# Patient Record
Sex: FEMALE | Race: WHITE | HISPANIC OR LATINO | Employment: FULL TIME | URBAN - METROPOLITAN AREA
[De-identification: names, ages, dates, MRNs, and addresses within clinical notes are randomized per-mention and may not be internally consistent; named-entity substitution may affect disease eponyms.]

---

## 2019-05-09 ENCOUNTER — HOSPITAL ENCOUNTER (EMERGENCY)
Facility: HOSPITAL | Age: 24
Discharge: PSYCHIATRIC OR SUBSTANCE USE FACILITY | End: 2019-05-10
Attending: EMERGENCY MEDICINE
Payer: COMMERCIAL

## 2019-05-09 DIAGNOSIS — F41.9 ANXIETY: ICD-10-CM

## 2019-05-09 DIAGNOSIS — F43.0 ACUTE STRESS REACTION: Primary | ICD-10-CM

## 2019-05-09 DIAGNOSIS — F43.0 ACUTE STRESS REACTION WITH PREDOMINATELY EMOTIONAL DISTURBANCE: ICD-10-CM

## 2019-05-09 DIAGNOSIS — F43.10 PTSD (POST-TRAUMATIC STRESS DISORDER): ICD-10-CM

## 2019-05-09 PROCEDURE — 99285 EMERGENCY DEPT VISIT HI MDM: CPT | Mod: 25

## 2019-05-09 PROCEDURE — 99283 PR EMERGENCY DEPT VISIT,LEVEL III: ICD-10-PCS | Mod: GT,,, | Performed by: NURSE PRACTITIONER

## 2019-05-09 PROCEDURE — 99283 EMERGENCY DEPT VISIT LOW MDM: CPT | Mod: GT,,, | Performed by: NURSE PRACTITIONER

## 2019-05-10 VITALS
HEART RATE: 80 BPM | HEIGHT: 65 IN | BODY MASS INDEX: 18.06 KG/M2 | SYSTOLIC BLOOD PRESSURE: 124 MMHG | DIASTOLIC BLOOD PRESSURE: 72 MMHG | RESPIRATION RATE: 20 BRPM | WEIGHT: 108.38 LBS | OXYGEN SATURATION: 98 % | TEMPERATURE: 98 F

## 2019-05-10 LAB
ALBUMIN SERPL BCP-MCNC: 4.6 G/DL (ref 3.5–5.2)
ALP SERPL-CCNC: 82 U/L (ref 55–135)
ALT SERPL W/O P-5'-P-CCNC: 12 U/L (ref 10–44)
AMPHET+METHAMPHET UR QL: NEGATIVE
ANION GAP SERPL CALC-SCNC: 10 MMOL/L (ref 8–16)
APAP SERPL-MCNC: <3 UG/ML (ref 10–20)
AST SERPL-CCNC: 20 U/L (ref 10–40)
B-HCG UR QL: NEGATIVE
BARBITURATES UR QL SCN>200 NG/ML: NEGATIVE
BASOPHILS # BLD AUTO: 0.03 K/UL (ref 0–0.2)
BASOPHILS NFR BLD: 0.2 % (ref 0–1.9)
BENZODIAZ UR QL SCN>200 NG/ML: NEGATIVE
BILIRUB SERPL-MCNC: 0.7 MG/DL (ref 0.1–1)
BILIRUB UR QL STRIP: NEGATIVE
BUN SERPL-MCNC: 11 MG/DL (ref 6–20)
BZE UR QL SCN: NEGATIVE
CALCIUM SERPL-MCNC: 9.8 MG/DL (ref 8.7–10.5)
CANNABINOIDS UR QL SCN: NORMAL
CHLORIDE SERPL-SCNC: 107 MMOL/L (ref 95–110)
CLARITY UR: CLEAR
CO2 SERPL-SCNC: 23 MMOL/L (ref 23–29)
COLOR UR: YELLOW
CREAT SERPL-MCNC: 0.8 MG/DL (ref 0.5–1.4)
CREAT UR-MCNC: 46.2 MG/DL (ref 15–325)
DIFFERENTIAL METHOD: ABNORMAL
EOSINOPHIL # BLD AUTO: 0.2 K/UL (ref 0–0.5)
EOSINOPHIL NFR BLD: 1.5 % (ref 0–8)
ERYTHROCYTE [DISTWIDTH] IN BLOOD BY AUTOMATED COUNT: 12.5 % (ref 11.5–14.5)
EST. GFR  (AFRICAN AMERICAN): >60 ML/MIN/1.73 M^2
EST. GFR  (NON AFRICAN AMERICAN): >60 ML/MIN/1.73 M^2
ETHANOL SERPL-MCNC: <10 MG/DL
GLUCOSE SERPL-MCNC: 94 MG/DL (ref 70–110)
GLUCOSE UR QL STRIP: NEGATIVE
HCT VFR BLD AUTO: 42 % (ref 37–48.5)
HGB BLD-MCNC: 14.7 G/DL (ref 12–16)
HGB UR QL STRIP: NEGATIVE
KETONES UR QL STRIP: NEGATIVE
LEUKOCYTE ESTERASE UR QL STRIP: NEGATIVE
LYMPHOCYTES # BLD AUTO: 4.1 K/UL (ref 1–4.8)
LYMPHOCYTES NFR BLD: 33.3 % (ref 18–48)
MCH RBC QN AUTO: 31.1 PG (ref 27–31)
MCHC RBC AUTO-ENTMCNC: 35 G/DL (ref 32–36)
MCV RBC AUTO: 89 FL (ref 82–98)
METHADONE UR QL SCN>300 NG/ML: NEGATIVE
MONOCYTES # BLD AUTO: 0.9 K/UL (ref 0.3–1)
MONOCYTES NFR BLD: 7 % (ref 4–15)
NEUTROPHILS # BLD AUTO: 7.1 K/UL (ref 1.8–7.7)
NEUTROPHILS NFR BLD: 58 % (ref 38–73)
NITRITE UR QL STRIP: NEGATIVE
OPIATES UR QL SCN: NEGATIVE
PCP UR QL SCN>25 NG/ML: NEGATIVE
PH UR STRIP: 8 [PH] (ref 5–8)
PLATELET # BLD AUTO: 282 K/UL (ref 150–350)
PMV BLD AUTO: 9.6 FL (ref 9.2–12.9)
POTASSIUM SERPL-SCNC: 3.7 MMOL/L (ref 3.5–5.1)
PROT SERPL-MCNC: 7.4 G/DL (ref 6–8.4)
PROT UR QL STRIP: NEGATIVE
RBC # BLD AUTO: 4.72 M/UL (ref 4–5.4)
SALICYLATES SERPL-MCNC: <5 MG/DL (ref 15–30)
SODIUM SERPL-SCNC: 140 MMOL/L (ref 136–145)
SP GR UR STRIP: 1.01 (ref 1–1.03)
TOXICOLOGY INFORMATION: NORMAL
TSH SERPL DL<=0.005 MIU/L-ACNC: 3.6 UIU/ML (ref 0.4–4)
URN SPEC COLLECT METH UR: NORMAL
UROBILINOGEN UR STRIP-ACNC: NEGATIVE EU/DL
WBC # BLD AUTO: 12.21 K/UL (ref 3.9–12.7)

## 2019-05-10 PROCEDURE — 93010 EKG 12-LEAD: ICD-10-PCS | Mod: ICN,,, | Performed by: INTERNAL MEDICINE

## 2019-05-10 PROCEDURE — 80307 DRUG TEST PRSMV CHEM ANLYZR: CPT

## 2019-05-10 PROCEDURE — 81003 URINALYSIS AUTO W/O SCOPE: CPT | Mod: 59

## 2019-05-10 PROCEDURE — 80329 ANALGESICS NON-OPIOID 1 OR 2: CPT

## 2019-05-10 PROCEDURE — 93005 ELECTROCARDIOGRAM TRACING: CPT

## 2019-05-10 PROCEDURE — 85025 COMPLETE CBC W/AUTO DIFF WBC: CPT

## 2019-05-10 PROCEDURE — 80053 COMPREHEN METABOLIC PANEL: CPT

## 2019-05-10 PROCEDURE — 81025 URINE PREGNANCY TEST: CPT

## 2019-05-10 PROCEDURE — 80320 DRUG SCREEN QUANTALCOHOLS: CPT

## 2019-05-10 PROCEDURE — 93010 ELECTROCARDIOGRAM REPORT: CPT | Mod: ICN,,, | Performed by: INTERNAL MEDICINE

## 2019-05-10 PROCEDURE — 84443 ASSAY THYROID STIM HORMONE: CPT

## 2019-05-10 RX ORDER — VENLAFAXINE 75 MG/1
75 TABLET ORAL 2 TIMES DAILY
COMMUNITY
End: 2020-02-26

## 2019-05-10 RX ORDER — QUETIAPINE FUMARATE 50 MG/1
50 TABLET, FILM COATED ORAL NIGHTLY
COMMUNITY
End: 2020-02-26

## 2019-05-10 NOTE — ED NOTES
ACKNOWLEDGMENT OF RIGHTS SIGNED & PLACED ON CHART.     SAFE WORD: pt. Refused     PEC process explained to pt. Educated on visiting hours. Verbalizes understanding of POC. Pt remains in grey gown & yellow non slip socks. Rex at bedside for direct observation. NAD noted. Denies any other needs at the time. Will continue to monitor.    CONTACT #1   See pt. Paper chart.     Patient belongings include:   Clothing  Watch   Earrings and nose ring  Cell phone  Wallet with cards and loose change  Tan bag with clothes   Black bag with books  Pillow   Binder  . PATIENT BELONGINGS SECURED IN locker 29.

## 2019-05-10 NOTE — ED PROVIDER NOTES
SCRIBE #1 NOTE: I, Danni Smith, am scribing for, and in the presence of, Candy Cole MD. I have scribed the entire note.       History     Chief Complaint   Patient presents with    Anxiety     pt reports feelings of anxiousness and overwhelmed with past events. pt denies si, hi or vh. pt reports compliance with medications.      Review of patient's allergies indicates:  No Known Allergies      History of Present Illness     HPI    5/9/2019, 10:33 PM  History obtained from the patient      History of Present Illness: Toño Cavanaugh is a 23 y.o. female patient with a PMHx of major depression and mood disorder who presents to the Emergency Department for evaluation of anxiety which onset gradually a few days ago. Pt reports she feels overwhelmed and is having flashbacks of traumatic events that happened years ago. Pt also reports her fiancee broke up with her yesterday. Symptoms are constant and moderate in severity. No mitigating or exacerbating factors reported. No associated sxs reported. Patient denies any fever, chills, N/V/D, SI, HI, behavior problems, confusion, sleep disturbances, and all other sxs at this time. No prior Tx reported. Pt reports she is compliant with all of her psych meds. No further complaints or concerns at this time.         Arrival mode: Personal vehicle      PCP: Primary Doctor unknown         Past Medical History:  Past Medical History:   Diagnosis Date    Major depression     Mood disorder        Past Surgical History:  Past surgical history reviewed not relevant      Family History:  Family History   Problem Relation Age of Onset    Diabetes Maternal Grandmother     Hypertension Maternal Grandmother     Mental illness Maternal Grandmother     Breast cancer Neg Hx     Colon cancer Neg Hx     Ovarian cancer Neg Hx        Social History:  Social History     Tobacco Use    Smoking status: Current Every Day Smoker    Smokeless tobacco: Never Used   Substance and  Sexual Activity    Alcohol use: Yes     Comment: rarely     Drug use: Yes     Frequency: 1.0 times per week     Types: Marijuana    Sexual activity: Yes     Partners: Female, Male        Review of Systems     Review of Systems   Constitutional: Negative for chills and fever.   HENT: Negative for sore throat.    Respiratory: Negative for shortness of breath.    Cardiovascular: Negative for chest pain.   Gastrointestinal: Negative for diarrhea, nausea and vomiting.   Genitourinary: Negative for dysuria.   Musculoskeletal: Negative for back pain.   Skin: Negative for rash.   Neurological: Negative for weakness.   Hematological: Does not bruise/bleed easily.   Psychiatric/Behavioral: Negative for behavioral problems, confusion, sleep disturbance and suicidal ideas. The patient is nervous/anxious.         (-) HI   All other systems reviewed and are negative.     Physical Exam     Initial Vitals [05/09/19 2205]   BP Pulse Resp Temp SpO2   (!) 162/90 91 19 98.5 °F (36.9 °C) 98 %      MAP       --          Physical Exam  Nursing Notes and Vital Signs Reviewed.  Constitutional: Patient is in no acute distress. Well-developed and well-nourished.  Head: Atraumatic. Normocephalic.  Eyes: PERRL. EOM intact. Conjunctivae are not pale. No scleral icterus.  ENT: Mucous membranes are moist. Oropharynx is clear and symmetric.    Neck: Supple. Full ROM. No lymphadenopathy.  Cardiovascular: Regular rate. Regular rhythm. No murmurs, rubs, or gallops. Distal pulses are 2+ and symmetric.  Pulmonary/Chest: No respiratory distress. Clear to auscultation bilaterally. No wheezing or rales.  Abdominal: Soft and non-distended.  There is no tenderness.  No rebound, guarding, or rigidity. Good bowel sounds.  Genitourinary: No CVA tenderness  Musculoskeletal: Moves all extremities. No obvious deformities. No edema. No calf tenderness.  Skin: Warm and dry.  Neurological:  Alert, awake, and appropriate.  Normal speech.  No acute focal  "neurological deficits are appreciated.  Psychiatric:               Behavior: normal, cooperative              Mood and Affect: depressed affect anxious               Thought Process: goal directed              Suicidal Ideations: No              Suicidal Plan: No specific plan to harm self              Homicidal Ideations: No              Hallucinations: none     ED Course   Procedures  ED Vital Signs:  Vitals:    05/09/19 2205   BP: (!) 162/90   Pulse: 91   Resp: 19   Temp: 98.5 °F (36.9 °C)   TempSrc: Oral   SpO2: 98%   Weight: 49.2 kg (108 lb 5.7 oz)   Height: 5' 5" (1.651 m)       Abnormal Lab Results:  Labs Reviewed   ACETAMINOPHEN LEVEL - Abnormal; Notable for the following components:       Result Value    Acetaminophen (Tylenol), Serum <3.0 (*)     All other components within normal limits   SALICYLATE LEVEL - Abnormal; Notable for the following components:    Salicylate Lvl <5.0 (*)     All other components within normal limits   CBC W/ AUTO DIFFERENTIAL - Abnormal; Notable for the following components:    Mean Corpuscular Hemoglobin 31.1 (*)     All other components within normal limits   DRUG SCREEN PANEL, URINE EMERGENCY   ALCOHOL,MEDICAL (ETHANOL)   TSH   COMPREHENSIVE METABOLIC PANEL   PREGNANCY TEST, URINE RAPID   URINALYSIS, REFLEX TO URINE CULTURE    Narrative:     Preferred Collection Type->Urine, Clean Catch        All Lab Results:  Results for orders placed or performed during the hospital encounter of 05/09/19   Drug screen panel, emergency   Result Value Ref Range    Benzodiazepines Negative     Methadone metabolites Negative     Cocaine (Metab.) Negative     Opiate Scrn, Ur Negative     Barbiturate Screen, Ur Negative     Amphetamine Screen, Ur Negative     THC Presumptive Positive     Phencyclidine Negative     Creatinine, Random Ur 46.2 15.0 - 325.0 mg/dL    Toxicology Information SEE COMMENT    Acetaminophen level   Result Value Ref Range    Acetaminophen (Tylenol), Serum <3.0 (L) 10.0 - " 20.0 ug/mL   Salicylate level   Result Value Ref Range    Salicylate Lvl <5.0 (L) 15.0 - 30.0 mg/dL   Ethanol   Result Value Ref Range    Alcohol, Medical, Serum <10 <10 mg/dL   TSH   Result Value Ref Range    TSH 3.597 0.400 - 4.000 uIU/mL   Comprehensive metabolic panel   Result Value Ref Range    Sodium 140 136 - 145 mmol/L    Potassium 3.7 3.5 - 5.1 mmol/L    Chloride 107 95 - 110 mmol/L    CO2 23 23 - 29 mmol/L    Glucose 94 70 - 110 mg/dL    BUN, Bld 11 6 - 20 mg/dL    Creatinine 0.8 0.5 - 1.4 mg/dL    Calcium 9.8 8.7 - 10.5 mg/dL    Total Protein 7.4 6.0 - 8.4 g/dL    Albumin 4.6 3.5 - 5.2 g/dL    Total Bilirubin 0.7 0.1 - 1.0 mg/dL    Alkaline Phosphatase 82 55 - 135 U/L    AST 20 10 - 40 U/L    ALT 12 10 - 44 U/L    Anion Gap 10 8 - 16 mmol/L    eGFR if African American >60 >60 mL/min/1.73 m^2    eGFR if non African American >60 >60 mL/min/1.73 m^2   CBC auto differential   Result Value Ref Range    WBC 12.21 3.90 - 12.70 K/uL    RBC 4.72 4.00 - 5.40 M/uL    Hemoglobin 14.7 12.0 - 16.0 g/dL    Hematocrit 42.0 37.0 - 48.5 %    Mean Corpuscular Volume 89 82 - 98 fL    Mean Corpuscular Hemoglobin 31.1 (H) 27.0 - 31.0 pg    Mean Corpuscular Hemoglobin Conc 35.0 32.0 - 36.0 g/dL    RDW 12.5 11.5 - 14.5 %    Platelets 282 150 - 350 K/uL    MPV 9.6 9.2 - 12.9 fL    Gran # (ANC) 7.1 1.8 - 7.7 K/uL    Lymph # 4.1 1.0 - 4.8 K/uL    Mono # 0.9 0.3 - 1.0 K/uL    Eos # 0.2 0.0 - 0.5 K/uL    Baso # 0.03 0.00 - 0.20 K/uL    Gran% 58.0 38.0 - 73.0 %    Lymph% 33.3 18.0 - 48.0 %    Mono% 7.0 4.0 - 15.0 %    Eosinophil% 1.5 0.0 - 8.0 %    Basophil% 0.2 0.0 - 1.9 %    Differential Method Automated    Pregnancy, urine rapid   Result Value Ref Range    Preg Test, Ur Negative    Urinalysis, Reflex to Urine Culture Urine, Clean Catch   Result Value Ref Range    Specimen UA Urine, Clean Catch     Color, UA Yellow Yellow, Straw, Cindi    Appearance, UA Clear Clear    pH, UA 8.0 5.0 - 8.0    Specific Gravity, UA 1.010 1.005 - 1.030     Protein, UA Negative Negative    Glucose, UA Negative Negative    Ketones, UA Negative Negative    Bilirubin (UA) Negative Negative    Occult Blood UA Negative Negative    Nitrite, UA Negative Negative    Urobilinogen, UA Negative <2.0 EU/dL    Leukocytes, UA Negative Negative         The EKG was ordered, reviewed, and independently interpreted by the ED provider.  Interpretation time: 0038  Rate: 83 BPM  Rhythm: Normal sinus rhythm with sinus arrhythmia   Interpretation: Minimal voltage criteria for LVH, may be normal variant. Borderline ECG. No STEMI.             The Emergency Provider reviewed the vital signs and test results, which are outlined above.     ED Discussion       12:25 AM: Discussed pt's case with Dr. Berry (Psychiatry) who recommends PEC'ing pt.    12:30 AM: The PEC hold has been issued by Dr. Cole at this time for acute stress reaction/ PTSD.    12:30 AM PEC. Pt will be PEC'd. Informed patient and family that psychiatric services are not available at this facility. Notified them of the need to transfer to another facility with available services. Pt will need to be medically cleared prior to transfer. They understand and agree with the plan as discussed. Answered any questions at this time.      1:26 AM - Re-evaluation: The patient is resting comfortably and is in no acute distress. They have been medically cleared and are awaiting placement/transfer to a psychiatric facility for further evaluation. Pt is stable for transfer at this time.     1:26 AM: Pt has been medically cleared by Dr. Cole at this time. Reassessed pt at this time. Pt is resting comfortably and appears in no acute distress. There are no psychiatric services offered at this facility. D/w pt all pertinent ED information and plan to transfer to psychiatric facility for psychiatric treatment. Pt verbalizes understanding. Patient being transferred by [facility] for ongoing personal protection en route. Pt has been made aware of  all risks and benefits associated with transfer, including but not limited to death, MVC, loss of vital signs, and/or permanent disability. Benefits include ability to be treated at an inpatient psychiatric facility. Pt will be transported by personnel trained in CPR and CPI. Patient understands that there could be unforeseen motor vehicle accidents, inclement weather, or loss of vital signs that could result in potential death or permanent disability. All questions and complaints have been addressed at this time. Pt condition is stable at this time and is clear to transfer to psychiatric facility at this time.         ED Medication(s):  Medications - No data to display      Medical Decision Making:   Clinical Tests:   Lab Tests: Ordered and Reviewed  Medical Tests: Ordered and Reviewed             Scribe Attestation:   Scribe #1: I performed the above scribed service and the documentation accurately describes the services I performed. I attest to the accuracy of the note.     Attending:   Physician Attestation Statement for Scribe #1: I, Candy Cole MD, personally performed the services described in this documentation, as scribed by Danni Smith, in my presence, and it is both accurate and complete.           Clinical Impression       ICD-10-CM ICD-9-CM   1. Acute stress reaction F43.0 308.9   2. Acute stress reaction with predominately emotional disturbance F43.9 308.0   3. PTSD (post-traumatic stress disorder) F43.10 309.81   4. Anxiety F41.9 300.00       Disposition:   Disposition: Transferred  Condition: Stable         Candy Cole MD  05/10/19 0129

## 2019-05-10 NOTE — ED NOTES
Patient transferred to [facility] facility with [facility] at this time.

## 2019-05-10 NOTE — CONSULTS
"[facility]  Psychiatry  Telepsychiatry Consult Note    Please see previous notes:    Patient agreeable to consultation via telepsychiatry.    Tele-Consultation from Psychiatry started: 5/9/2019 at 1140  The chief complaint leading to psychiatric consultation is: anxiety and feels like can't function  This consultation was requested by ANDREE Cole MD, the Emergency Department attending physician.  The location of the consulting psychiatrist is [address] [place] [address].  The patient location is  [facility] EMERGENCY DEPARTMENT   The patient arrived at the ED at: 2233    Also present with the patient at the time of the consultation: he requested friend to leave during interview but agreed to let me speak to him after initial interview    Patient Identification:   Toño Cavanaugh is a 23 y.o. female.    Patient information was obtained from patient.  Patient presented voluntarily to the Emergency Department by private vehicle.    Consults  Subjective:     History of Present Illness:  PER ER MD:   presents to the Emergency Department for evaluation of anxiety which onset gradually a few days ago. Pt reports she feels overwhelmed and is having flashbacks of traumatic events that happened years ago. Pt also reports her fiancee broke up with her yesterday. Symptoms are constant and moderate in severity. No mitigating or exacerbating factors reported. No associated sxs reported. Patient denies any fever, chills, N/V/D, SI, HI, behavior problems, confusion, sleep disturbances, and all other sxs at this time. No prior Tx reported. Pt reports she is compliant with all of her psych meds. No further complaints or concerns at this time.     On my interview: Patient sitting in bed with friend. He left during the interview and was interview separately following interview with patient. Arnulfo reports he has been "having a really hard time functioning."Recently having "lots of flash back to traumatic events that " "have happened in my life, I don't know how to handle living." Denies SI "I just don't know how to function." Stopped working, just sitting in a ball crying all day. Relationship ended yesterday and he feels "that has triggered everything and I can't turn it off now." Feels disabled, "I can't operate." Admits to self harm- picking at fingers. Patient tearful throughout interview. Reports diagnosed with Major Depression in the past "but now they are think Bipolar." Reports first manic episode last week.     Borderline Personality:  Impaired Emotional Control: excessive, poorly regulated emotional responses, especially anger, that change rapidly- yes but usually sadness rather than anger  Harmful Impulsivity: impulsive behaviors that are harmful to you or to others, such as spending sprees, excessive use of alcohol or drugs, self-injurious acts (e.g., cutting), physically aggressive acts and sexual indiscretions- yes patient agrees.   Impaired Perceptions and Reasoning: suspiciousness, misperceptions (spliting), an unstable self-image, a poor sense of your identity, and difficulty in reasoning under stress- feels she has a strong sense of self but otherwise agrees with statement  Disrupted Relationships: tumultuous relationships with a person close to you that vary from extreme fear of abandonment to episodes of excessive anger and the desire to get away from that person.- yes patient agrees.      Collateral: Júnior Draper-  Best friend- reports "he is in a rut," dealing with PTSD, "really dark time," "looking for some help." Reports he wants to go somewhere to process this information. Believes he will just go to another hospital tomorrow. When asked if he felt he was safe to go home he stated "He needs help." He does not feel he is alright and could not state he felt patient was safe to go home.         Psychiatric History:   Previous Psychiatric Hospitalizations: Yes at age 16 for intrusive violent thoughts [facility] " "Behavioral   Previous Medication Trials: Yes Effexor and Seroquel current, Adderall- too many side effects  Previous Suicide Attempts: no - constant thoughts about suicide but never acted- was hospitalized at 16  History of Violence: denies  History of Depression: diagnosed with major depression 2016  History of Rupal: reports first manic episode- barely slept, energy, impulsiveness  History of Auditory/Visual Hallucination: as child  History of Delusions: god had a plan for her but she reports this was a joke- she did not believe it  Outpatient psychiatrist (current & past): Yes- [facility]- last saw them 4/25/19    Substance Abuse History:  Tobacco:Yes- 1 pack per day  Alcohol: rarely- avoiding it recently  Illicit Substances:Yes- marijuana- daily  Detox/Rehab: No    Legal History: Past charges/incarcerations: No     Family Psychiatric History: bipolar and schizotypal       Social History:  Developmental/Childhood:Achieved all developmental milestones timely  *Education:High School Diploma  Employment Status/Finances: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[employer]; 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   Relationship Status/Sexual Orientation: Single:  Recent breakup  Children: 0  Housing Status: Home    history:  NO  Access to gun: NO  Latter-day:"trying to figuring that out"  Recreational activities:"drawing, print making"    Psychiatric Mental Status Exam:  Arousal: alert  Sensorium/Orientation: oriented to person, place, year  Behavior/Cooperation: normal, cooperative   Speech: normal tone, normal rate, normal pitch, normal volume  Language: grossly intact  Mood: " not great "   Affect: depressed  Thought Process: normal and logical  Thought Content:   Auditory hallucinations: NO  Visual hallucinations: NO  Paranoia: NO  Delusions:  NO  Suicidal ideation: NO  Homicidal ideation: NO  Attention/Concentration:  spelled "WORLD" forwards and backwards  Memory:    Recent:  Intact   Remote: Intact   3/3 immediate, 0/3 at " "5 min  Fund of Knowledge: Aware of current events   Abstract reasoning: proverbs were abstract  Insight: has awareness of illness  Judgment: behavior is adequate to circumstances      Past Medical History: No past medical history on file.   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      Assessment - Diagnosis - Goals:     Diagnosis/Impression: Acute stress reaction, PTSD    Rec: - PEC at this time as the pt is currently gravely disabled related to PTSD. Seek inpt bed for pt safety and stabilization when/if medically cleared by the ER MD.    Time with patient: 40 minutes      More than 50% of the time was spent counseling/coordinating care    Consulting clinician was informed of the encounter and consult note.    Consultation ended: 5/9/2019 at 0027    Theresa Berry DNP   Psychiatry  [facility]    "

## 2019-05-10 NOTE — ED NOTES
Patient accepted by [facility] at [facility] ([address], [place], [place]) for the service of Dr. Chacon.  Report to be called to [phone], option2.

## 2019-05-10 NOTE — ED NOTES
Faxed packet to [facility], [facility], [facility], [facility].

## 2019-06-05 ENCOUNTER — HOSPITAL ENCOUNTER (EMERGENCY)
Facility: HOSPITAL | Age: 24
Discharge: HOME | End: 2019-06-05
Attending: EMERGENCY MEDICINE
Payer: COMMERCIAL

## 2019-06-05 VITALS
HEIGHT: 65 IN | BODY MASS INDEX: 17.67 KG/M2 | WEIGHT: 106.06 LBS | SYSTOLIC BLOOD PRESSURE: 110 MMHG | OXYGEN SATURATION: 98 % | TEMPERATURE: 98 F | HEART RATE: 97 BPM | RESPIRATION RATE: 18 BRPM | DIASTOLIC BLOOD PRESSURE: 69 MMHG

## 2019-06-05 DIAGNOSIS — N83.201 CYST OF RIGHT OVARY: Primary | ICD-10-CM

## 2019-06-05 LAB
B-HCG UR QL: NEGATIVE
BILIRUB UR QL STRIP: NEGATIVE
CLARITY UR: CLEAR
COLOR UR: YELLOW
GLUCOSE UR QL STRIP: NEGATIVE
HGB UR QL STRIP: NEGATIVE
KETONES UR QL STRIP: NEGATIVE
LEUKOCYTE ESTERASE UR QL STRIP: NEGATIVE
NITRITE UR QL STRIP: NEGATIVE
PH UR STRIP: 7 [PH] (ref 5–8)
PROT UR QL STRIP: ABNORMAL
SP GR UR STRIP: 1.02 (ref 1–1.03)
URN SPEC COLLECT METH UR: ABNORMAL
UROBILINOGEN UR STRIP-ACNC: 1 EU/DL

## 2019-06-05 PROCEDURE — 99284 EMERGENCY DEPT VISIT MOD MDM: CPT

## 2019-06-05 PROCEDURE — 81025 URINE PREGNANCY TEST: CPT

## 2019-06-05 PROCEDURE — 81003 URINALYSIS AUTO W/O SCOPE: CPT

## 2019-06-05 RX ORDER — HYDROCODONE BITARTRATE AND ACETAMINOPHEN 5; 325 MG/1; MG/1
1 TABLET ORAL EVERY 4 HOURS PRN
Qty: 11 TABLET | Refills: 0 | Status: SHIPPED | OUTPATIENT
Start: 2019-06-05 | End: 2019-06-15

## 2019-06-05 NOTE — ED NOTES
Patient is resting in bed no distress noted. Call light is within patients reach and she is instructed to use call light if she needs anything. Patients boyfriend is at bedside. Will continue to monitor patient.

## 2019-06-05 NOTE — ED NOTES
Patient complains of pelvic pain and nausea since yesterday. Patient reports pain is a constant pain that radiates to her lower back area. Patient denies fever or chills. Patient also denies taking anything for pain.       Level of Consciousness: The patient is awake, alert, and oriented to person, place and time.  Appearance: Sitting up with no acute distress noted. Clothing and hygiene are clean and worn appropriately.  Skin: Skin is intact; color consistent with ethnicity. Dry and warm to the touch.    HEENT: WNL   Musculoskeletal: Moves all extremities well in full range of motion. No obvious deformities or swelling noted.  Respiratory: Airway open and patent, respirations spontaneous, even and unlabored. No accessory muscles in use. Breath sounds are clear.   Cardiac: Regular rate, no peripheral edema noted.  Abdomen:  Soft, No distention. Patient reports tenderness with palpation. Bowl sounds are normal.   Neurologic: PERRLA, face exhibits symmetrical expression, reports normal sensation to all extremities and face.    Patient verbalized understanding of status and plan of care.

## 2019-06-05 NOTE — ED PROVIDER NOTES
SCRIBE #1 NOTE: I, Corinne Mack, am scribing for, and in the presence of, Kareem Hdez MD. I have scribed the entire note.      History      Chief Complaint   Patient presents with    Pelvic Pain     c/o pelvic pain and low back pain        Review of patient's allergies indicates:  No Known Allergies     HPI   HPI    6/5/2019, 3:54 PM   History obtained from the patient      History of Present Illness: Toño Cavanaugh is a 23 y.o. female patient who presents to the Emergency Department for pelvic pain which onset gradually yesterday. Symptoms are constant and moderate in severity. No mitigating or exacerbating factors reported. Associated sxs include back pain and nausea. Patient denies any fever, chills, CP, SOB, N/V, abd pain, neck pain, dysuria, hematuria, vaginal bleeding, vaginal pain, vaginal d/c, HA, and all other sxs at this time. No prior Tx reported. No further complaints or concerns at this time. Patient states that she does not want any bloodwork done.  Very afraid of needles.        Arrival mode: Personal vehicle    PCP: Primary Doctor No       Past Medical History:  Past Medical History:   Diagnosis Date    Major depression     Mood disorder        Past Surgical History:  History reviewed. No pertinent surgical history.    Family History:  Family History   Problem Relation Age of Onset    Diabetes Maternal Grandmother     Hypertension Maternal Grandmother     Mental illness Maternal Grandmother     Breast cancer Neg Hx     Colon cancer Neg Hx     Ovarian cancer Neg Hx        Social History:  Social History     Tobacco Use    Smoking status: Current Every Day Smoker    Smokeless tobacco: Never Used   Substance and Sexual Activity    Alcohol use: Yes     Comment: rarely     Drug use: Yes     Frequency: 1.0 times per week     Types: Marijuana    Sexual activity: Yes     Partners: Female, Male       ROS   Review of Systems   Constitutional: Negative for chills and fever.   Respiratory:  "Negative for cough and shortness of breath.    Cardiovascular: Negative for chest pain and leg swelling.   Gastrointestinal: Positive for nausea. Negative for abdominal pain, diarrhea and vomiting.   Genitourinary: Positive for pelvic pain. Negative for dysuria, flank pain, hematuria, vaginal bleeding, vaginal discharge and vaginal pain.   Musculoskeletal: Positive for back pain. Negative for neck pain and neck stiffness.   Skin: Negative for rash and wound.   Neurological: Negative for dizziness, light-headedness, numbness and headaches.   All other systems reviewed and are negative.    Physical Exam      Initial Vitals [06/05/19 1550]   BP Pulse Resp Temp SpO2   110/69 97 18 98 °F (36.7 °C) 98 %      MAP       --          Physical Exam  Nursing Notes and Vital Signs Reviewed.  Constitutional: Patient is in no acute distress. Well-developed and well-nourished.  Head: Atraumatic. Normocephalic.  Eyes: PERRL. EOM intact. Conjunctivae are not pale. No scleral icterus.  ENT: Mucous membranes are moist. Oropharynx is clear and symmetric.    Neck: Supple. Full ROM. No lymphadenopathy.  Cardiovascular: Regular rate. Regular rhythm. No murmurs, rubs, or gallops. Distal pulses are 2+ and symmetric.  Pulmonary/Chest: No respiratory distress. Clear to auscultation bilaterally. No wheezing or rales.  Abdominal: Soft and non-distended.  There is no tenderness.  No rebound, guarding, or rigidity.   : deferred.  Musculoskeletal: Moves all extremities. No obvious deformities. No edema.   Skin: Warm and dry.  Neurological:  Alert, awake, and appropriate.  Normal speech.  No acute focal neurological deficits are appreciated.  Psychiatric: Normal affect. Good eye contact. Appropriate in content.    ED Course    Procedures  ED Vital Signs:  Vitals:    06/05/19 1550   BP: 110/69   Pulse: 97   Resp: 18   Temp: 98 °F (36.7 °C)   SpO2: 98%   Weight: 48.1 kg (106 lb 0.7 oz)   Height: 5' 5" (1.651 m)       Abnormal Lab Results:  Labs " Reviewed   URINALYSIS, REFLEX TO URINE CULTURE - Abnormal; Notable for the following components:       Result Value    Protein, UA Trace (*)     All other components within normal limits    Narrative:     Preferred Collection Type->Urine, Clean Catch   PREGNANCY TEST, URINE RAPID        All Lab Results:  Results for orders placed or performed during the hospital encounter of 06/05/19   Urinalysis, Reflex to Urine Culture Urine, Clean Catch   Result Value Ref Range    Specimen UA Urine, Clean Catch     Color, UA Yellow Yellow, Straw, Cindi    Appearance, UA Clear Clear    pH, UA 7.0 5.0 - 8.0    Specific Gravity, UA 1.020 1.005 - 1.030    Protein, UA Trace (A) Negative    Glucose, UA Negative Negative    Ketones, UA Negative Negative    Bilirubin (UA) Negative Negative    Occult Blood UA Negative Negative    Nitrite, UA Negative Negative    Urobilinogen, UA 1.0 <2.0 EU/dL    Leukocytes, UA Negative Negative   Pregnancy, urine rapid   Result Value Ref Range    Preg Test, Ur Negative        Imaging Results:  Imaging Results          CT Renal Stone Study ABD Pelvis WO (Final result)  Result time 06/05/19 17:49:17    Final result by Nicolás Dinero III, MD (06/05/19 17:49:17)                 Impression:      1.  No definite kidney stones or obstruction.    2.  Lack of IV and oral contrast severely limits this study.  Large volume of stool in the colon.  No definite inflammatory mass in the right lower quadrant to suggest acute appendicitis..  If further detail related, would recommend repeating the scan with IV and oral contrast.    3.  Probable ovarian cyst anterior margin of the pelvis on the right measuring at least 4.4 cm in diameter.  This could be correlated with ultrasound if indicated.    All CT scans at this facility are performed  using dose modulation techniques as appropriate to performed exam including the following:  automated exposure control; adjustment of mA and/or kV according to the patients size  (this includes techniques or standardized protocols for targeted exams where dose is matched to indication/reason for exam: i.e. extremities or head);  iterative reconstruction technique.      Electronically signed by: Nicolás Dinero  Date:    06/05/2019  Time:    17:49             Narrative:    EXAMINATION:  CT RENAL STONE STUDY ABD PELVIS WO    CLINICAL HISTORY:  Flank pain, stone disease suspected;    TECHNIQUE:  Axial CT images performed through the abdomen and pelvis without intravenous contrast. Multiplanar reformats were performed and interpreted.    COMPARISON:  None    FINDINGS:  The lung bases are grossly clear.  There is no free intraperitoneal air or bowel obstruction.  Bony windows show no gross acute abnormality.  The liver and spleen are unremarkable.  There is no adrenal or pancreatic mass or enlargement.  There is no solid renal mass or obstruction.  No definite stone.  Lack of oral and IV contrast limiting detail.    There is no ascites or adenopathy.  No abnormality in the right lower quadrant that would definitely suggest acute appendicitis..  IUD noted in position.  There is also a density in the anterior margin of the pelvis on the right, most likely representing an ovarian cyst measuring 4.4 cm in diameter.                                        The Emergency Provider reviewed the vital signs and test results, which are outlined above.    ED Discussion     5:56 PM: Reassessed pt at this time. Pt is awake, alert, and in no distress. Pt states she is feeling much better. Discussed with pt all pertinent ED information and results. Discussed pt dx and plan of tx. Gave pt all f/u and return to the ED instructions. All questions and concerns were addressed at this time. Pt expresses understanding of information and instructions, and is comfortable with plan to discharge. Pt is stable for discharge.    I discussed with patient and/or family/caretaker that evaluation in the ED does not suggest any  emergent or life threatening medical conditions requiring immediate intervention beyond what was provided in the ED, and I believe patient is safe for discharge.  Regardless, an unremarkable evaluation in the ED does not preclude the development or presence of a serious of life threatening condition. As such, patient was instructed to return immediately for any worsening or change in current symptoms.      ED Medication(s):  Medications - No data to display       Medication List      START taking these medications    HYDROcodone-acetaminophen 5-325 mg per tablet  Commonly known as:  NORCO  Take 1 tablet by mouth every 4 (four) hours as needed for Pain.        ASK your doctor about these medications    SEROquel 50 MG tablet  Generic drug:  QUEtiapine     venlafaxine 75 MG tablet  Commonly known as:  EFFEXOR           Where to Get Your Medications      You can get these medications from any pharmacy    Bring a paper prescription for each of these medications  · HYDROcodone-acetaminophen 5-325 mg per tablet         Follow-up Information     OB/GYN In 2 days.    Contact information:  [phone]                   Medical Decision Making    Medical Decision Making:   Clinical Tests:   Lab Tests: Ordered and Reviewed  Radiological Study: Ordered and Reviewed     Additional MDM:   Smoking Cessation: The patient is a smoker. The patient was counseled on smoking cessation for: 3 minutes. The patient was counseled on tobacco related  health complications.        Scribe Attestation:   Scribe #1: I performed the above scribed service and the documentation accurately describes the services I performed. I attest to the accuracy of the note 06/05/2019.    Attending:   Physician Attestation Statement for Scribe #1: I, Kareem Hdez MD, personally performed the services described in this documentation, as scribed by Corinne Mack, in my presence, and it is both accurate and complete.          Clinical Impression       ICD-10-CM  ICD-9-CM   1. Cyst of right ovary N83.201 620.2       Disposition:   Disposition: Discharged  Condition: Stable           Kareem Hdez MD  06/05/19 5403

## 2019-06-27 ENCOUNTER — TELEPHONE (OUTPATIENT)
Dept: PHARMACY | Facility: CLINIC | Age: 24
End: 2019-06-27

## 2019-06-27 NOTE — TELEPHONE ENCOUNTER
No answer/VM to inform patient that [facility] received prescription for Truvada and benefits investigation is required.  [facility] will be back in touch once insurance determination is received.

## 2019-06-27 NOTE — TELEPHONE ENCOUNTER
Documentation Only:     Prior Authorization for Truvada IS NOT required     Patient co-pay: $250     Patient Assistance IS required.     Patient has been enrolled in Truvada copay card program which will reduce patient copay to $0. Copay card info is listed below.     Truvada Copay Card  Bin: [id number]  PCN: [id number]  Grp: [id number]  ID: [id number]  Help Desk: [phone]        Forwarding to clinical pharmacist for consult and shipment.    AKF 4:46pm

## 2019-06-28 ENCOUNTER — TELEPHONE (OUTPATIENT)
Dept: PHARMACY | Facility: CLINIC | Age: 24
End: 2019-06-28

## 2019-06-28 NOTE — TELEPHONE ENCOUNTER
Attempted to contact patient for initial consult Truvada.  Unable to contact patient - no voicemail.

## 2019-07-01 NOTE — TELEPHONE ENCOUNTER
Patient plans to start Truvada on .  Truvada counseling complete. Name/ confirmed. Consult included: indication; goals of treatment; administration; storage and handling; side effects; how to handle missed doses; the importance of compliance; the importance of maintaining lab appts and follow up appts w\ MD; safe sex practices with condoms. Patient understands that medication alone is not primary HIV prophylaxis. Patient understands this medication will only serve as protection for HIV and no other STDs. Patient understands to contact [facility] and MD for any medication changes due to drug interactions. He understands the importance of 3month regular testing. He understands the importance of ONCE daily dosing due to the potential renal effects of tenofovir. All questions answered and addressed to the patients satisfaction.       Discussed the importance of staying well hydrated while on therapy. Compliance stressed - patient to take missed doses as soon as remembered, but NOT to take 2 doses in one day. Patient will report questions or concerns to myself or practitioner. Patient verbalizes understanding. I will personally f/u with patient in 2 weeks from start, and [facility] will contact patient in 3 weeks to coordinate next refill.        Patient plans to start Truvada on . (please schedule labs accordingly)  Approximately 15 mins spent with the patient on medication education.      Thank you for allowing us to participate in the care of your patient     Ahbay Moncada, R.Ph., AAHIVP  Clinical Pharmacist, HIV/HCV  [facility]  Phone: [phone]

## 2019-07-22 ENCOUNTER — TELEPHONE (OUTPATIENT)
Dept: RADIOLOGY | Facility: HOSPITAL | Age: 24
End: 2019-07-22

## 2019-07-25 ENCOUNTER — TELEPHONE (OUTPATIENT)
Dept: PHARMACY | Facility: CLINIC | Age: 24
End: 2019-07-25

## 2019-07-25 NOTE — TELEPHONE ENCOUNTER
Patient called for refill readiness and follow up on Truvada.  No answer - lvm for call back.     Abhay Moncada, MADDIE.Ph., AAHIVP  Clinical Pharmacist, HIV/HCV  [facility]  Phone: [phone]

## 2019-07-30 NOTE — TELEPHONE ENCOUNTER
Attempted to contact patient for refill/followup Truvada.  Unable to contact patient - Voicemail full.

## 2019-08-01 NOTE — TELEPHONE ENCOUNTER
Refill readiness for Truvada as PrEP confirmed with patient; name/ confirmed; no missed doses; no new medications; no side effects noted; address confirmed for  shipment and  delivery.  Patient states they have 7 doses remaining.     Abhay Moncada, MADDIE.Ph., AAHIVP  Clinical Pharmacist, HIV/HCV  [facility]  Phone: [phone]

## 2019-08-23 NOTE — TELEPHONE ENCOUNTER
FOR DOCUMENTATION ONLY:    Financial Assistance for Truvada approved from 8/1/2019 to 12/31/2019    Source: [place] [employer] Card    ID: [id number]  BIN: [id number]  PCN: [id number]  GRP: [id number]

## 2019-08-26 ENCOUNTER — TELEPHONE (OUTPATIENT)
Dept: PHARMACY | Facility: CLINIC | Age: 24
End: 2019-08-26

## 2019-08-27 ENCOUNTER — TELEPHONE (OUTPATIENT)
Dept: PHARMACY | Facility: CLINIC | Age: 24
End: 2019-08-27

## 2019-09-03 NOTE — TELEPHONE ENCOUNTER
Refill readiness for Truvada confirmed with patient; name/ confirmed; 2 missed doses- nonconsecutive (patient forgot to take it one night and then a few days later forgot to take it as well. Counseled her on the importance of adhering to truvada and that if she misses her dose for 2 days in a row, truvada is not effective until she restarts it for a week. Patient verbalized understanding); no new medications; no side effects noted; address confirmed for  shipment and  delivery. $0 copay. Patient states they have 7 doses remaining.     Gabriella Elizabeth, Pharm.D.  Pharmacy Resident, PGY-1   [facility]

## 2019-09-24 ENCOUNTER — TELEPHONE (OUTPATIENT)
Dept: PHARMACY | Facility: CLINIC | Age: 24
End: 2019-09-24

## 2019-09-24 NOTE — TELEPHONE ENCOUNTER
Patient called to inform her that rx for Truvada as PrEP is not refillable and that the patient should be due for labs.  Note that this is a non-[facility] provider and we do not have access to labs data or an ability to communicate with the provider electronically.     Patient called to discuss.  NA - mailbox full.     Abhay Moncada, R.Ph., AAHIVP  Clinical Pharmacist, HIV/HCV  [facility]  Phone: [phone]

## 2019-09-27 NOTE — TELEPHONE ENCOUNTER
Attempted to reach patient to follow-up on need for labs. No answer. Unable to leave voicemail. Will follow-up.

## 2019-10-01 NOTE — TELEPHONE ENCOUNTER
Refill readiness for Truvada as PrEP confirmed with patient; name/ confirmed; no new medications; no side effects noted; address confirmed for 10/2 shipment and 10/3 delivery.  Patient did say that they struggled with adherence in the first week or so but that has greatly improved.      Clinical follow-up conducted for Truvada as PrEP. Name/ confirmed. No new medications; no side effects noted. Patient understands to report any medication changes to [facility] and provider. All questions answered and addressed to patients satisfaction.      MADDIE Rubio.Ph., AAHIVP  Clinical Pharmacist, HIV/HCV  [facility]  Phone: [phone]

## 2019-10-23 ENCOUNTER — TELEPHONE (OUTPATIENT)
Dept: PHARMACY | Facility: CLINIC | Age: 24
End: 2019-10-23

## 2019-11-21 ENCOUNTER — TELEPHONE (OUTPATIENT)
Dept: PHARMACY | Facility: CLINIC | Age: 24
End: 2019-11-21

## 2020-01-13 ENCOUNTER — HOSPITAL ENCOUNTER (EMERGENCY)
Facility: OTHER | Age: 25
Discharge: HOME | End: 2020-01-14
Attending: EMERGENCY MEDICINE
Payer: COMMERCIAL

## 2020-01-13 DIAGNOSIS — S59.909A ELBOW INJURY, INITIAL ENCOUNTER: ICD-10-CM
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
## 2020-01-13 DIAGNOSIS — S93.402A MILD ANKLE SPRAIN, LEFT, INITIAL ENCOUNTER: ICD-10-CM

## 2020-01-13 DIAGNOSIS — S69.91XA WRIST INJURY, RIGHT, INITIAL ENCOUNTER: ICD-10-CM

## 2020-01-13 DIAGNOSIS — S66.912A STRAIN OF LEFT WRIST, INITIAL ENCOUNTER: ICD-10-CM

## 2020-01-13 LAB
B-HCG UR QL: NEGATIVE
CTP QC/QA: YES

## 2020-01-13 PROCEDURE — 81025 URINE PREGNANCY TEST: CPT | Performed by: EMERGENCY MEDICINE

## 2020-01-13 PROCEDURE — 25000003 PHARM REV CODE 250: Performed by: EMERGENCY MEDICINE

## 2020-01-13 PROCEDURE — 99284 EMERGENCY DEPT VISIT MOD MDM: CPT | Mod: 25

## 2020-01-13 RX ORDER — IBUPROFEN 600 MG/1
600 TABLET ORAL
Status: COMPLETED | OUTPATIENT
Start: 2020-01-13 | End: 2020-01-13

## 2020-01-13 RX ADMIN — IBUPROFEN 600 MG: 600 TABLET, FILM COATED ORAL at 11:01

## 2020-01-13 RX ADMIN — BACITRACIN, NEOMYCIN, POLYMYXIN B 1 EACH: 400; 3.5; 5 OINTMENT TOPICAL at 11:01

## 2020-01-14 VITALS
WEIGHT: 110 LBS | BODY MASS INDEX: 18.33 KG/M2 | DIASTOLIC BLOOD PRESSURE: 66 MMHG | RESPIRATION RATE: 18 BRPM | HEART RATE: 70 BPM | TEMPERATURE: 98 F | OXYGEN SATURATION: 96 % | HEIGHT: 65 IN | SYSTOLIC BLOOD PRESSURE: 100 MMHG

## 2020-01-14 RX ORDER — IBUPROFEN 600 MG/1
600 TABLET ORAL EVERY 6 HOURS PRN
Qty: 20 TABLET | Refills: 0 | Status: SHIPPED | OUTPATIENT
Start: 2020-01-14 | End: 2020-02-26

## 2020-01-14 NOTE — ED TRIAGE NOTES
Pt presents to ED s/p ground level slip and fall. Reports breaking her fall and having pain to her left wrist and left knee. Denies head trauma and LOC. No deformity or crepitus noted. No numbness/tingling

## 2020-01-14 NOTE — DISCHARGE INSTRUCTIONS
No fractures were noted on any of your x-rays today.  If you have persistent pain in the right wrist, you should have a repeat x-ray in 1 week, as it is possible to have an occult fracture.  Drink alcohol in moderation only.  Follow up with your doctor or return for new or worsening symptoms.

## 2020-01-14 NOTE — ED PROVIDER NOTES
Encounter Date: 1/13/2020    SCRIBE #1 NOTE: I, Sophy Hernandez, am scribing for, and in the presence of, Dr. Tenorio.       History     Chief Complaint   Patient presents with    Fall     slid on mud and fell onto concrete an hour PTA, pain to left wrist and knee, ambulatory     Time seen by provider: 11:30 PM    This is a 24 y.o. female who presents with complaint of 6/10 bilateral wrist pain and left knee pain after a trip and fall a few hours ago.  Pain is described as dull.  The patient also reports an abrasion to her left wrist. The patient reports as she was walking she slipped on mud causing her to twist her left leg and fall onto her left arm. She denies hitting her head or LOC. She reports she had to be helped up. She admits to drinking about 2.5 beers prior to the fall, and states she does not normally drink. The patient denies symptoms prior to fall including CP, SOB, dizziness, or weakness. She denies recent flu like symptoms. The patient reports she has an IUD but denies other medication use. She denies major medical hx. She reports tobacco, alcohol, and marijuana use.    The history is provided by the patient.     Review of patient's allergies indicates:  No Known Allergies  Past Medical History:   Diagnosis Date    Major depression     Mood disorder      History reviewed. No pertinent surgical history.  Family History   Problem Relation Age of Onset    Diabetes Maternal Grandmother     Hypertension Maternal Grandmother     Mental illness Maternal Grandmother     Breast cancer Neg Hx     Colon cancer Neg Hx     Ovarian cancer Neg Hx      Social History     Tobacco Use    Smoking status: Current Every Day Smoker    Smokeless tobacco: Never Used   Substance Use Topics    Alcohol use: Yes     Comment: rarely     Drug use: Yes     Frequency: 1.0 times per week     Types: Marijuana     Review of Systems   Constitutional: Negative for chills and fever.   HENT: Negative for congestion and sore  throat.    Eyes: Negative for visual disturbance.   Respiratory: Negative for shortness of breath.    Cardiovascular: Negative for chest pain and palpitations.   Gastrointestinal: Negative for abdominal pain, diarrhea and vomiting.   Genitourinary: Negative for decreased urine volume, dysuria and vaginal discharge.   Musculoskeletal: Positive for arthralgias (Left knee. Bilateral wrists.). Negative for back pain, joint swelling, neck pain and neck stiffness.   Skin: Positive for wound (Abrasion to left wrist.). Negative for rash.   Neurological: Negative for dizziness, syncope and weakness.   Psychiatric/Behavioral: Negative for behavioral problems and confusion.   All other systems reviewed and are negative.      Physical Exam     Initial Vitals [01/13/20 2227]   BP Pulse Resp Temp SpO2   129/79 90 16 98.1 °F (36.7 °C) 100 %      MAP       --         Physical Exam    Nursing note and vitals reviewed.  Constitutional: She appears well-developed and well-nourished. She is not diaphoretic. No distress.   HENT:   Head: Normocephalic and atraumatic.   Mouth/Throat: Oropharynx is clear and moist. No oropharyngeal exudate.   Eyes: Conjunctivae and EOM are normal. Pupils are equal, round, and reactive to light. No scleral icterus.   Neck: Normal range of motion. Neck supple.   Cardiovascular: Normal rate, regular rhythm and normal heart sounds. Exam reveals no gallop and no friction rub.    No murmur heard.  Pulmonary/Chest: Breath sounds normal. No respiratory distress. She has no wheezes. She has no rhonchi. She has no rales.   Abdominal: Soft. There is no tenderness. There is no rebound and no guarding.   Musculoskeletal: Normal range of motion. She exhibits tenderness.   LUE: Tenderness of the distal ulnar with small overlying abrasion. No tenderness over the scaphoid. Tenderness of the medial epicondyle of the elbow.  RUE: Tenderness over the scaphoid.  LLE: Tenderness of the anterior ankle.  All other joints were  aggressively palpated and ranged without tenderness or decreased ROM except as otherwise mentioned.  There is no midline tenderness of the cervical spine.    There is no midline tenderness of the thoracic spine.    There is no midline tenderness of the lumbar spine.    There is no tenderness over the sacrum.   Neurological: She is alert and oriented to person, place, and time. She has normal strength. No cranial nerve deficit or sensory deficit. GCS score is 15. GCS eye subscore is 4. GCS verbal subscore is 5. GCS motor subscore is 6.   Skin: Skin is warm and dry. No rash noted. No erythema. No pallor.   Psychiatric: She has a normal mood and affect. Thought content normal.         ED Course   Procedures  Labs Reviewed   POCT URINE PREGNANCY          Imaging Results          X-Ray Wrist Complete Right (Final result)  Result time 01/14/20 00:16:55    Final result by Emerson Christianson MD (01/14/20 00:16:55)                 Impression:      No acute fracture.      Electronically signed by: Emerson Christianson MD  Date:    01/14/2020  Time:    00:16             Narrative:    EXAMINATION:  XR WRIST COMPLETE 3 VIEWS RIGHT    CLINICAL HISTORY:  Unspecified injury of right wrist, hand and finger(s), initial encounter    TECHNIQUE:  PA, lateral, and oblique views of the right wrist were performed.    COMPARISON:  None    FINDINGS:  No fracture or dislocation.  Cartilage spaces are maintained.  Soft tissues are unremarkable.                               X-Ray Elbow Complete Left (Final result)  Result time 01/14/20 00:25:01    Final result by Catherine Jorgensen MD (01/14/20 00:25:01)                 Impression:      No acute bony abnormality detected.      Electronically signed by: Catherine Jorgensen  Date:    01/14/2020  Time:    00:25             Narrative:    EXAMINATION:  TWO OR MORE VIEWS OF THE LEFT ELBOW, LEFT WRIST AND LEFT HAND    CLINICAL HISTORY:  Pain.    TECHNIQUE:  Two or more views of the left elbow, left wrist,  and left hand    COMPARISON:  None.    FINDINGS:  Two or more views of the left elbow, left wrist and left hand demonstrate no acute fracture or dislocation.                               X-Ray Ankle Complete Left (Final result)  Result time 01/14/20 00:16:24    Final result by Emerson Christianson MD (01/14/20 00:16:24)                 Impression:      No acute fracture.      Electronically signed by: Emerson Christianson MD  Date:    01/14/2020  Time:    00:16             Narrative:    EXAMINATION:  XR ANKLE COMPLETE 3 VIEW LEFT    CLINICAL HISTORY:  Sprain of unspecified ligament of left ankle, initial encounter    TECHNIQUE:  AP, lateral and oblique views of the left ankle were performed.    COMPARISON:  None    FINDINGS:  No fracture or dislocation.  Ankle mortise is symmetric.  Talar dome is maintained.  Soft tissues are unremarkable.                               X-Ray Hand 2 View Left (Final result)  Result time 01/14/20 00:25:01    Final result by Catherine Jorgensen MD (01/14/20 00:25:01)                 Impression:      No acute bony abnormality detected.      Electronically signed by: Catherine Jorgensen  Date:    01/14/2020  Time:    00:25             Narrative:    EXAMINATION:  TWO OR MORE VIEWS OF THE LEFT ELBOW, LEFT WRIST AND LEFT HAND    CLINICAL HISTORY:  Pain.    TECHNIQUE:  Two or more views of the left elbow, left wrist, and left hand    COMPARISON:  None.    FINDINGS:  Two or more views of the left elbow, left wrist and left hand demonstrate no acute fracture or dislocation.                               X-Ray Wrist Complete Left (Final result)  Result time 01/14/20 00:25:01    Final result by Catherine Jorgensen MD (01/14/20 00:25:01)                 Impression:      No acute bony abnormality detected.      Electronically signed by: Catherine Jorgensen  Date:    01/14/2020  Time:    00:25             Narrative:    EXAMINATION:  TWO OR MORE VIEWS OF THE LEFT ELBOW, LEFT WRIST AND LEFT HAND    CLINICAL  HISTORY:  Pain.    TECHNIQUE:  Two or more views of the left elbow, left wrist, and left hand    COMPARISON:  None.    FINDINGS:  Two or more views of the left elbow, left wrist and left hand demonstrate no acute fracture or dislocation.                              X-Rays:   Independently Interpreted Readings:   Other Readings:  Left Ankle - No fracture. No dislocation. No acute process.    Medical Decision Making:   History:   Old Medical Records: I decided to obtain old medical records.  Independently Interpreted Test(s):   I have ordered and independently interpreted X-rays - see prior notes.  Clinical Tests:   Lab Tests: Ordered and Reviewed  Radiological Study: Ordered and Reviewed  ED Management:  Urgent evaluation a 24-year-old female who presents with complaint of wrist and ankle pain after a fall.  Vital signs are benign, afebrile.  Physical exam reveals tenderness over the left distal ulna, tenderness of the right scaphoid, tenderness of the anterior left ankle.  She is not currently clinically intoxicated, and there is no evidence of head injury. X-rays of these areas show no acute process.  Given scaphoid tenderness will treat for possible occult fracture with Velcro wrist splint.  She is encouraged that if this pain persist to return for repeat x-ray or to her PCP in 1 week for repeat x-ray.  She was treated in the ED with ibuprofen and is discharged with a prescription for same.  Abrasion was cleaned and dressed with antibiotic ointment and a Band-Aid.  Patient states she is up-to-date on tetanus.  I also encouraged her to use alcohol in moderation only.            Scribe Attestation:   Scribe #1: I performed the above scribed service and the documentation accurately describes the services I performed. I attest to the accuracy of the note.    Attending Attestation:           Physician Attestation for Scribe:  Physician Attestation Statement for Scribe #1: I, Dr. Tenorio, reviewed documentation, as  scribed by Sophy Hernandez in my presence, and it is both accurate and complete.                               Clinical Impression:     1. Fall    2. Wrist injury, right, initial encounter    3. Elbow injury, initial encounter    4. Mild ankle sprain, left, initial encounter    5. Strain of left wrist, initial encounter                                Katelynn Tenorio MD  01/15/20 1139

## 2020-02-20 ENCOUNTER — OFFICE VISIT (OUTPATIENT)
Dept: INTERNAL MEDICINE | Facility: CLINIC | Age: 25
End: 2020-02-20
Payer: COMMERCIAL

## 2020-02-20 VITALS
WEIGHT: 117.5 LBS | HEIGHT: 65 IN | HEART RATE: 66 BPM | TEMPERATURE: 98 F | DIASTOLIC BLOOD PRESSURE: 78 MMHG | OXYGEN SATURATION: 100 % | BODY MASS INDEX: 19.58 KG/M2 | SYSTOLIC BLOOD PRESSURE: 110 MMHG

## 2020-02-20 DIAGNOSIS — F33.1 MODERATE EPISODE OF RECURRENT MAJOR DEPRESSIVE DISORDER: ICD-10-CM

## 2020-02-20 DIAGNOSIS — Z20.6 CONTACT WITH AND (SUSPECTED) EXPOSURE TO HUMAN IMMUNODEFICIENCY VIRUS (HIV): ICD-10-CM

## 2020-02-20 DIAGNOSIS — B00.1 COLD SORE: Primary | ICD-10-CM

## 2020-02-20 DIAGNOSIS — F43.10 PTSD (POST-TRAUMATIC STRESS DISORDER): ICD-10-CM

## 2020-02-20 DIAGNOSIS — Z72.0 SMOKING TRYING TO QUIT: ICD-10-CM

## 2020-02-20 DIAGNOSIS — Z00.00 ENCOUNTER FOR HEALTH MAINTENANCE EXAMINATION: ICD-10-CM

## 2020-02-20 DIAGNOSIS — Z11.3 ROUTINE SCREENING FOR STI (SEXUALLY TRANSMITTED INFECTION): ICD-10-CM

## 2020-02-20 DIAGNOSIS — Z01.812 PRE-OPERATIVE LABORATORY EXAMINATION: ICD-10-CM

## 2020-02-20 PROCEDURE — 99999 PR PBB SHADOW E&M-EST. PATIENT-LVL V: CPT | Mod: PBBFAC,,, | Performed by: INTERNAL MEDICINE

## 2020-02-20 PROCEDURE — 99999 PR PBB SHADOW E&M-EST. PATIENT-LVL V: ICD-10-PCS | Mod: PBBFAC,,, | Performed by: INTERNAL MEDICINE

## 2020-02-20 PROCEDURE — 87591 N.GONORRHOEAE DNA AMP PROB: CPT

## 2020-02-20 PROCEDURE — 3008F PR BODY MASS INDEX (BMI) DOCUMENTED: ICD-10-PCS | Mod: CPTII,S$GLB,, | Performed by: INTERNAL MEDICINE

## 2020-02-20 PROCEDURE — 99203 PR OFFICE/OUTPT VISIT, NEW, LEVL III, 30-44 MIN: ICD-10-PCS | Mod: S$GLB,,, | Performed by: INTERNAL MEDICINE

## 2020-02-20 PROCEDURE — 99203 OFFICE O/P NEW LOW 30 MIN: CPT | Mod: S$GLB,,, | Performed by: INTERNAL MEDICINE

## 2020-02-20 PROCEDURE — 87591 N.GONORRHOEAE DNA AMP PROB: CPT | Mod: 91

## 2020-02-20 PROCEDURE — 3008F BODY MASS INDEX DOCD: CPT | Mod: CPTII,S$GLB,, | Performed by: INTERNAL MEDICINE

## 2020-02-20 RX ORDER — VALACYCLOVIR HYDROCHLORIDE 1 G/1
2000 TABLET, FILM COATED ORAL EVERY 12 HOURS PRN
Qty: 20 TABLET | Refills: 0 | Status: SHIPPED | OUTPATIENT
Start: 2020-02-20

## 2020-02-20 RX ORDER — NICOTINE 7MG/24HR
1 PATCH, TRANSDERMAL 24 HOURS TRANSDERMAL DAILY
Qty: 28 PATCH | Refills: 2 | Status: SHIPPED | OUTPATIENT
Start: 2020-02-20

## 2020-02-20 NOTE — Clinical Note
Also meant to include - has a strong reaction/fear of needles, so would like to minimize lab draws if at all possible. I've put in initial PrEP / establish care lab orders so that you can put in any additional ones you want related to hormone start.
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
## 2020-02-20 NOTE — Clinical Note
Kymberly Cowan - this patient established care with me last week, interested in seeing you to discuss testosterone. Does want top surgery in the future. Could you contact him to help schedule? Preferred name Arnulfo (this update appears to have gone through), pronouns are xe / xyr / xyrs or he / him.Note one complication related to social situation - he has been estranged from family in the past, his mother is not supportive of transition but he is on her insurance, and worried that if she gets information via insurance/billing that he is having appts or meds related to transition that she may take him off of her insurance and potentially have further conflict.

## 2020-02-21 LAB
C TRACH DNA SPEC QL NAA+PROBE: NOT DETECTED
N GONORRHOEA DNA SPEC QL NAA+PROBE: NOT DETECTED

## 2020-02-24 LAB
C TRACH RRNA SPEC QL NAA+PROBE: NEGATIVE
C.TRACH RNA SOURCE: NORMAL
N.GONORROHEAE, AMP RNA SOURCE: NORMAL
N.GONORROHEAE, MISC. AMP RNA: NEGATIVE

## 2020-02-26 ENCOUNTER — TELEPHONE (OUTPATIENT)
Dept: INTERNAL MEDICINE | Facility: CLINIC | Age: 25
End: 2020-02-26

## 2020-02-26 PROBLEM — F33.1 MODERATE EPISODE OF RECURRENT MAJOR DEPRESSIVE DISORDER: Status: ACTIVE | Noted: 2020-02-26

## 2020-02-26 PROBLEM — B00.1 COLD SORE: Status: ACTIVE | Noted: 2020-02-26

## 2020-02-26 PROBLEM — Z72.0 SMOKING TRYING TO QUIT: Status: ACTIVE | Noted: 2020-02-26

## 2020-02-26 PROBLEM — F43.10 PTSD (POST-TRAUMATIC STRESS DISORDER): Status: ACTIVE | Noted: 2020-02-26

## 2020-02-26 NOTE — TELEPHONE ENCOUNTER
----- Message from Christopher Núñez MD sent at 2/26/2020  4:15 PM CST -----  Please call patient re: results. Note preferred name!  Please let patient know that his tests for gonorrhea and chlamydia are negative (both urine and throat test results have returned).   Let him know as well that I have sent a message to Dr Zheng about helping him get scheduled in her clinic, and to the specialty pharmacy re: descmarcely.    Christopher Núñez MD

## 2020-05-11 ENCOUNTER — TELEPHONE (OUTPATIENT)
Dept: INTERNAL MEDICINE | Facility: CLINIC | Age: 25
End: 2020-05-11

## 2020-05-11 ENCOUNTER — HOSPITAL ENCOUNTER (EMERGENCY)
Facility: OTHER | Age: 25
Discharge: HOME | End: 2020-05-11
Attending: EMERGENCY MEDICINE
Payer: COMMERCIAL

## 2020-05-11 VITALS
WEIGHT: 120 LBS | SYSTOLIC BLOOD PRESSURE: 139 MMHG | OXYGEN SATURATION: 100 % | HEIGHT: 65 IN | TEMPERATURE: 99 F | RESPIRATION RATE: 18 BRPM | DIASTOLIC BLOOD PRESSURE: 86 MMHG | HEART RATE: 76 BPM | BODY MASS INDEX: 19.99 KG/M2

## 2020-05-11 DIAGNOSIS — M25.579 ANKLE PAIN: ICD-10-CM

## 2020-05-11 DIAGNOSIS — S82.851A CLOSED TRIMALLEOLAR FRACTURE OF RIGHT ANKLE, INITIAL ENCOUNTER: Primary | ICD-10-CM

## 2020-05-11 PROCEDURE — 25000003 PHARM REV CODE 250: Performed by: EMERGENCY MEDICINE

## 2020-05-11 PROCEDURE — 29505 APPLICATION LONG LEG SPLINT: CPT | Mod: RT

## 2020-05-11 PROCEDURE — 99283 EMERGENCY DEPT VISIT LOW MDM: CPT | Mod: 25

## 2020-05-11 RX ORDER — HYDROCODONE BITARTRATE AND ACETAMINOPHEN 10; 325 MG/1; MG/1
1 TABLET ORAL EVERY 4 HOURS PRN
Qty: 12 TABLET | Refills: 0 | Status: ON HOLD | OUTPATIENT
Start: 2020-05-11 | End: 2021-05-30 | Stop reason: HOSPADM

## 2020-05-11 RX ORDER — HYDROCODONE BITARTRATE AND ACETAMINOPHEN 10; 325 MG/1; MG/1
1 TABLET ORAL
Status: COMPLETED | OUTPATIENT
Start: 2020-05-11 | End: 2020-05-11

## 2020-05-11 RX ADMIN — HYDROCODONE BITARTRATE AND ACETAMINOPHEN 1 TABLET: 10; 325 TABLET ORAL at 07:05

## 2020-05-12 NOTE — ED PROVIDER NOTES
Encounter Date: 5/11/2020    SCRIBE #1 NOTE: I, Evette Kay, am scribing for, and in the presence of, Dr. Doran.       History     Chief Complaint   Patient presents with    Ankle Pain     pt came to the ed tonight c.o. right ankle pain since 6p s/p falling off of skateboard     Time seen by provider: 7:04 PM    This is a 24 y.o. female who presents with complaint of right ankle pain s/p injury that occurred 30 minutes PTA. Patient states she fell off of her skateboard and twisted her right ankle. She reports immediate onset of pain and swelling to right ankle. Patient denies any numbness or tingling.  Denies blood thinner use.  Denies any head injury.  Denies back and neck pain.    The history is provided by the patient.     Review of patient's allergies indicates:  No Known Allergies  Past Medical History:   Diagnosis Date    ADHD (attention deficit hyperactivity disorder)     on adderall in past; prefers to manage without stimulant medication    Knee injury 2011, 2015    Major depression     Mood disorder     PTSD (post-traumatic stress disorder)      History reviewed. No pertinent surgical history.  Family History   Problem Relation Age of Onset    Diabetes Maternal Grandmother     Hypertension Maternal Grandmother     Mental illness Maternal Grandmother         bipolar    Cancer Mother         melanoma, recurrent    Cancer Father         testicular cancer    Mental illness Father     Cancer Paternal Grandmother         skin cancer    Heart disease Paternal Grandfather         Mi in 70s    Breast cancer Neg Hx     Colon cancer Neg Hx     Ovarian cancer Neg Hx     Stroke Neg Hx      Social History     Tobacco Use    Smoking status: Current Every Day Smoker    Smokeless tobacco: Never Used    Tobacco comment: 2 cig/day   Substance Use Topics    Alcohol use: Yes     Comment: 2-3 times/week, up to 3 drinks    Drug use: Yes     Frequency: 1.0 times per week     Types: Marijuana     Comment:  daily MJ; no h/o IVDU or stimulant use     Review of Systems   Constitutional: Negative for chills and fever.   Musculoskeletal: Positive for arthralgias (right ankle) and joint swelling (right ankle). Negative for back pain.   Skin: Negative for rash and wound.   Neurological: Negative for dizziness, weakness, numbness and headaches.        Negative for tingling to right ankle.   Hematological: Does not bruise/bleed easily.       Physical Exam     Initial Vitals   BP Pulse Resp Temp SpO2   05/11/20 1855 05/11/20 1855 05/11/20 1855 05/11/20 2035 05/11/20 1855   (!) 143/90 71 18 98.5 °F (36.9 °C) 100 %      MAP       --                Physical Exam    Nursing note and vitals reviewed.  Constitutional: She appears well-developed and well-nourished. She is cooperative. No distress.   HENT:   Head: Atraumatic.   Eyes: Conjunctivae and lids are normal.   Neck: Normal range of motion and phonation normal.   Cardiovascular: Normal rate.   Pulses:       Dorsalis pedis pulses are 2+ on the right side, and 2+ on the left side.        Posterior tibial pulses are 2+ on the right side, and 2+ on the left side.   Pulmonary/Chest: No respiratory distress.   Musculoskeletal: Normal range of motion. She exhibits tenderness.   Right lower extremity:  Swelling and mild deformity noted to lateral malleolus. Tenderness to lateral and medial malleolus.   Neurological: She is alert.   Skin: Skin is warm. No rash noted.   No abrasions or open wounds.   Psychiatric: She has a normal mood and affect. Her speech is normal and behavior is normal.         ED Course   Splint Application  Date/Time: 5/11/2020 8:59 PM  Performed by: Mirta Doran MD  Authorized by: Mirta Doran MD   Consent Done: Not Needed  Location details: right ankle  Splint type: ankle stirrup (posterior and stirrup)  Post-procedure: The splinted body part was neurovascularly unchanged following the procedure.  Patient tolerance: Patient tolerated the procedure well with  no immediate complications        Labs Reviewed - No data to display       Imaging Results          X-Ray Ankle Complete Right (Final result)  Result time 05/11/20 19:37:29    Final result by Luis Alberto Zavala MD (05/11/20 19:37:29)                 Impression:      Acute fractures of the distal fibula, medial malleolus and posterior malleolus, as above.      Electronically signed by: Luis Alberto Zavala MD  Date:    05/11/2020  Time:    19:37             Narrative:    EXAMINATION:  XR ANKLE COMPLETE 3 VIEW RIGHT    CLINICAL HISTORY:  Pain in unspecified ankle and joints of unspecified foot    TECHNIQUE:  AP, lateral, and oblique images of the right ankle were performed.    COMPARISON:  None    FINDINGS:  Bones are well mineralized.  Acute, spiral type fracture with mild displacement and slight angulation involving the distal diaphysis and metaphysis of the fibula extending medially to the base of the lateral malleolus at the level of the ankle joint.  Primarily transverse type fracture through the base of the medial malleolus at the level of the ankle joint with slight displacement but no significant angulation.  Acute, minimally displaced fracture through the posterior malleolus with intra-articular extension.  No dislocation or destructive osseous process.  The joint spaces otherwise appear relatively maintained without significant talar shift.  There is soft tissue swelling about the ankle as well as suspected ankle joint effusion.  Remaining joint spaces are maintained.  No dislocation or destructive osseous process.  No subcutaneous emphysema or radiodense retained foreign body.                              X-Rays:   Independently Interpreted Readings:   Other Readings:  Right ankle x-ray: Trimalleolar fracture noted.    Medical Decision Making:   History:   Old Medical Records: I decided to obtain old medical records.  Old Records Summarized: other records and records from clinic visits.  Initial Assessment:   7:04PM:   Patient is a 25-year-old female who presents to the emergency department after ankle injury.  Patient appears well, nontoxic.  She does have a deformity noted on exam.  Will plan for x-rays, analgesia, will continue to follow and reassess.  Independently Interpreted Test(s):   I have ordered and independently interpreted X-rays - see prior notes.  Clinical Tests:   Radiological Study: Ordered and Reviewed  Other:   I have discussed this case with another health care provider.    8:05PM:  Patient's x-ray indicates a trimalleolar fracture.  I discussed the patient with Dr. Spencer, who reviewed the x-rays, and will have the patient follow-up in clinic on Wednesday morning at 8:00 a.m. to discuss plans for surgery.    8:15PM:  I updated the patient regarding the results along with my conversation with Dr. Spencer and need for follow-up on Wednesday.  She understands the need for follow-up.  I did discuss with her about the strict need for nonweightbearing along with persistent elevation.  Will plan for splint placement.  Will continue to follow.    8:45PM:  Patient tolerated splint placement with no issues.  She remains neurovascularly intact.  Will plan to prescribe a course of pain medication to take as needed.  I counseled patient regarding splint care management.  I have discussed with the pt ED return warnings and need for close PCP f/u.  Pt agreeable to plan and all questions answered.  I feel that pt is stable for discharge and management as an outpatient and no further intervention is needed at this time.  Pt is comfortable returning to the ED if needed.  Will DC home in stable condition.                Scribe Attestation:   Scribe #1: I performed the above scribed service and the documentation accurately describes the services I performed. I attest to the accuracy of the note.    Attending Attestation:           Physician Attestation for Scribe:  Physician Attestation Statement for Scribe #1: I, Dr. Doran,  reviewed documentation, as scribed by Evette Kay in my presence, and it is both accurate and complete.                               Clinical Impression:     1. Closed trimalleolar fracture of right ankle, initial encounter    2. Ankle pain                ED Disposition Condition    Discharge Stable        ED Prescriptions     Medication Sig Dispense Start Date End Date Auth. Provider    HYDROcodone-acetaminophen (NORCO)  mg per tablet Take 1 tablet by mouth every 4 (four) hours as needed for Pain. 12 tablet 5/11/2020  Mirta Doran MD        Follow-up Information     Follow up With Specialties Details Why Contact Info    [facility] Orthopedic Surgery   [address] [address] [phone]      Bobby Spencer MD Orthopedic Surgery   [address] [place] [id number] [phone]                                       Mirta Doran MD  05/11/20 5419

## 2020-05-12 NOTE — ED TRIAGE NOTES
Pt presents to ED with right ankle pain s/p fall while roller skating x1 hr ago. Reports she felt a pop and was unable to move it after that. Denies head trauma, LOC, numbness/tingling. Swelling noted to right ankle

## 2020-05-12 NOTE — DISCHARGE INSTRUCTIONS
Follow up at Dr. Spencer's office in [place] on Wednesday at 8AM.    Do not bear any weight on that leg and keep it elevated as much as possible.    We have prescribed you pain medication. Please fill and take as directed. Do not drink alcohol or drive while taking this medication.  Do not take any additional tylenol while taking this medication.  It is important to take stool softeners and/or eat plenty of fiber to prevent constipation while taking this medication.       Please return to the ER if you have worsening pain, numbness/tingling, chest pain, difficulty breathing, fevers, altered mental status, dizziness, weakness, or any other concerns.

## 2020-05-15 ENCOUNTER — ANESTHESIA EVENT (OUTPATIENT)
Dept: SURGERY | Facility: OTHER | Age: 25
End: 2020-05-15
Payer: COMMERCIAL

## 2020-05-15 ENCOUNTER — HOSPITAL ENCOUNTER (OUTPATIENT)
Dept: PREADMISSION TESTING | Facility: OTHER | Age: 25
Discharge: HOME | End: 2020-05-15
Attending: INTERNAL MEDICINE
Payer: COMMERCIAL

## 2020-05-15 VITALS
SYSTOLIC BLOOD PRESSURE: 107 MMHG | HEART RATE: 62 BPM | DIASTOLIC BLOOD PRESSURE: 57 MMHG | OXYGEN SATURATION: 100 % | TEMPERATURE: 99 F | WEIGHT: 120 LBS | HEIGHT: 65 IN | BODY MASS INDEX: 19.99 KG/M2

## 2020-05-15 RX ORDER — PREGABALIN 50 MG/1
50 CAPSULE ORAL ONCE
Status: CANCELLED | OUTPATIENT
Start: 2020-05-15 | End: 2020-05-15

## 2020-05-15 RX ORDER — LIDOCAINE HYDROCHLORIDE 10 MG/ML
0.5 INJECTION, SOLUTION EPIDURAL; INFILTRATION; INTRACAUDAL; PERINEURAL ONCE
Status: CANCELLED | OUTPATIENT
Start: 2020-05-15 | End: 2020-05-15

## 2020-05-15 RX ORDER — ACETAMINOPHEN 500 MG
1000 TABLET ORAL
Status: CANCELLED | OUTPATIENT
Start: 2020-05-15 | End: 2020-05-15

## 2020-05-15 RX ORDER — SODIUM CHLORIDE, SODIUM LACTATE, POTASSIUM CHLORIDE, CALCIUM CHLORIDE 600; 310; 30; 20 MG/100ML; MG/100ML; MG/100ML; MG/100ML
INJECTION, SOLUTION INTRAVENOUS CONTINUOUS
Status: CANCELLED | OUTPATIENT
Start: 2020-05-15

## 2020-05-15 RX ORDER — ALBUTEROL SULFATE 0.83 MG/ML
2.5 SOLUTION RESPIRATORY (INHALATION)
Status: CANCELLED | OUTPATIENT
Start: 2020-05-15 | End: 2020-05-15

## 2020-05-15 RX ORDER — ONDANSETRON 4 MG/1
8 TABLET, FILM COATED ORAL 2 TIMES DAILY
Status: ON HOLD | COMMUNITY
End: 2021-05-30 | Stop reason: HOSPADM

## 2020-05-15 NOTE — DISCHARGE INSTRUCTIONS
Information to Prepare you for your Surgery    PRE-ADMIT TESTING -  [phone]    [address]  [place] [facility]          Your surgery has been scheduled at [facility]. We are pleased to have the opportunity to serve you. For Further Information please call [phone].    On the day of surgery please report to the Information Desk on the 1st floor.    · CONTACT YOUR PHYSICIAN'S OFFICE THE DAY PRIOR TO YOUR SURGERY TO OBTAIN YOUR ARRIVAL TIME.     · The evening before surgery do not eat anything after 9 p.m. ( this includes hard candy, chewing gum and mints).  You may only have GATORADE, POWERADE AND WATER  from 9 p.m. until you leave your home.   DO NOT DRINK ANY LIQUIDS ON THE WAY TO THE HOSPITAL.      SPECIAL MEDICATION INSTRUCTIONS: TAKE medications checked off by the Anesthesiologist on your Medication List.    Angiogram Patients: Take medications as instructed by your physician, including aspirin.     Surgery Patients:    If you take ASPIRIN - Your PHYSICIAN/SURGEON will need to inform you IF/OR when you need to stop taking aspirin prior to your surgery.     Do Not take any medications containing IBUPROFEN.  Do Not Wear any make-up or dark nail polish   (especially eye make-up) to surgery. If you come to surgery with makeup on you will be required to remove the makeup or nail polish.    Do not shave your surgical area at least 5 days prior to your surgery. The surgical prep will be performed at the hospital according to Infection Control regulations.    Leave all valuables at home.   Do Not wear any jewelry or watches, including any metal in body piercings. Jewelry must be removed prior to coming to the hospital.  There is a possibility that rings that are unable to be removed may be cut off if they are on the surgical extremity.    Contact Lens must be removed before surgery. Either do not wear the contact lens or bring a case and solution for  storage.  Please bring a container for eyeglasses or dentures as required.  Bring any paperwork your physician has provided, such as consent forms,  history and physicals, doctor's orders, etc.   Bring comfortable clothes that are loose fitting to wear upon discharge. Take into consideration the type of surgery being performed.  Maintain your diet as advised per your physician the day prior to surgery.      Adequate rest the night before surgery is advised.   Park in the Parking lot behind the hospital or in the [facility] across the street from the parking lot. Parking is complimentary.  If you will be discharged the same day as your procedure, please arrange for a responsible adult to drive you home or to accompany you if traveling by taxi.   YOU WILL NOT BE PERMITTED TO DRIVE OR TO LEAVE THE HOSPITAL ALONE AFTER SURGERY.   It is strongly recommended that you arrange for someone to remain with you for the first 24 hrs following your surgery.    The Surgeon will speak to your family/visitor after your surgery regarding the outcome of your surgery and post op care.  The Surgeon may speak to you after your surgery, but there is a possibility you may not remember the details.  Please check with your family members regarding the conversation with the Surgeon.    We strongly recommend whoever is bringing you home be present for discharge instructions.  This will ensure a thorough understanding for your post op home care.    EACH PATIENT IS ALLOWED TWO FAMILY MEMBERS OR VISITORS IN THE ROOM AND IN THE WAITING ROOMS WHILE YOU ARE IN SURGERY. ALL CHILDREN MUST ALWAYS BE ACCOMPANIED BY AN ADULT.    Thank you for your cooperation.  The Staff of [facility].                Bathing Instructions with Hibiclens     Shower the evening before and morning of your procedure with Hibiclens:   Wash your face with water and your regular face wash/soap   Apply Hibiclens directly on your skin or on a  wet washcloth and wash gently. When showering: Move away from the shower stream when applying Hibiclens to avoid rinsing off too soon.   Rinse thoroughly with warm water   Do not dilute Hibiclens         Dry off as usual, do not use any deodorant, powder, body lotions, perfume, after shave or cologne.

## 2020-05-15 NOTE — ANESTHESIA PREPROCEDURE EVALUATION
05/15/2020  Toño Cavanaugh is a 24 y.o., female.    Anesthesia Evaluation    I have reviewed the Patient Summary Reports.    I have reviewed the Nursing Notes.   I have reviewed the Medications.     Review of Systems  Anesthesia Hx:  Neg history of prior surgery. Denies Family Hx of Anesthesia complications.   Denies Personal Hx of Anesthesia complications.   Social:  Smoker Prev 1-1.5ppd, currently on patch, down to 3-4 cig/day   Hematology/Oncology:  Hematology Normal   Oncology Normal     EENT/Dental:EENT/Dental Normal   Cardiovascular:  Cardiovascular Normal Exercise tolerance: good     Pulmonary:  Pulmonary Normal    Renal/:  Renal/ Normal     Hepatic/GI:  Hepatic/GI Normal    Musculoskeletal:  Musculoskeletal Normal    Neurological:  Neurology Normal    Endocrine:  Endocrine Normal    Dermatological:  Skin Normal    Psych:   Psychiatric History (PTSD) depression          Physical Exam  General:  Well nourished      Dental:  Dental Findings: In tact        Mental Status:  Mental Status Findings:  Cooperative, Alert and Oriented         Anesthesia Plan  Type of Anesthesia, risks & benefits discussed:  Anesthesia Type:  general  Patient's Preference:   Intra-op Monitoring Plan: standard ASA monitors  Intra-op Monitoring Plan Comments:   Post Op Pain Control Plan: per primary service following discharge from PACU, multimodal analgesia and peripheral nerve block  Post Op Pain Control Plan Comments:   Induction:   IV  Beta Blocker:         Informed Consent: Patient understands risks and agrees with Anesthesia plan.  Questions answered. Anesthesia consent signed with patient.  ASA Score: 2     Day of Surgery Review of History & Physical:    H&P update referred to the surgeon.     Anesthesia Plan Notes: Very anxious!! Prefers IV versed over IM.  covid pending  Discussed Pop + Add blocks, need to  check with surgeon        Ready For Surgery From Anesthesia Perspective.

## 2020-05-16 ENCOUNTER — LAB VISIT (OUTPATIENT)
Dept: INTERNAL MEDICINE | Facility: CLINIC | Age: 25
End: 2020-05-16
Payer: COMMERCIAL

## 2020-05-16 DIAGNOSIS — Z01.818 PRE-OP TESTING: ICD-10-CM

## 2020-05-16 PROCEDURE — U0003 INFECTIOUS AGENT DETECTION BY NUCLEIC ACID (DNA OR RNA); SEVERE ACUTE RESPIRATORY SYNDROME CORONAVIRUS 2 (SARS-COV-2) (CORONAVIRUS DISEASE [COVID-19]), AMPLIFIED PROBE TECHNIQUE, MAKING USE OF HIGH THROUGHPUT TECHNOLOGIES AS DESCRIBED BY CMS-2020-01-R: HCPCS

## 2020-05-17 LAB — SARS-COV-2 RNA RESP QL NAA+PROBE: NOT DETECTED

## 2020-05-19 ENCOUNTER — ANESTHESIA (OUTPATIENT)
Dept: SURGERY | Facility: OTHER | Age: 25
End: 2020-05-19
Payer: COMMERCIAL

## 2020-05-19 ENCOUNTER — HOSPITAL ENCOUNTER (OUTPATIENT)
Facility: OTHER | Age: 25
Discharge: HOME | End: 2020-05-19
Attending: ORTHOPAEDIC SURGERY | Admitting: ORTHOPAEDIC SURGERY
Payer: COMMERCIAL

## 2020-05-19 VITALS
OXYGEN SATURATION: 98 % | HEIGHT: 65 IN | WEIGHT: 120 LBS | SYSTOLIC BLOOD PRESSURE: 115 MMHG | HEART RATE: 72 BPM | RESPIRATION RATE: 16 BRPM | DIASTOLIC BLOOD PRESSURE: 70 MMHG | BODY MASS INDEX: 19.99 KG/M2 | TEMPERATURE: 98 F

## 2020-05-19 DIAGNOSIS — S82.851A CLOSED TRIMALLEOLAR FRACTURE OF RIGHT ANKLE, INITIAL ENCOUNTER: ICD-10-CM

## 2020-05-19 DIAGNOSIS — Z01.818 PRE-OP TESTING: Primary | ICD-10-CM

## 2020-05-19 LAB
B-HCG UR QL: NEGATIVE
CTP QC/QA: YES

## 2020-05-19 PROCEDURE — 76942 ECHO GUIDE FOR BIOPSY: CPT | Performed by: ANESTHESIOLOGY

## 2020-05-19 PROCEDURE — C1769 GUIDE WIRE: HCPCS | Performed by: ORTHOPAEDIC SURGERY

## 2020-05-19 PROCEDURE — 71000015 HC POSTOP RECOV 1ST HR: Performed by: ORTHOPAEDIC SURGERY

## 2020-05-19 PROCEDURE — 63600175 PHARM REV CODE 636 W HCPCS: Performed by: ORTHOPAEDIC SURGERY

## 2020-05-19 PROCEDURE — 63600175 PHARM REV CODE 636 W HCPCS: Performed by: NURSE ANESTHETIST, CERTIFIED REGISTERED

## 2020-05-19 PROCEDURE — 25000003 PHARM REV CODE 250: Performed by: NURSE ANESTHETIST, CERTIFIED REGISTERED

## 2020-05-19 PROCEDURE — 94761 N-INVAS EAR/PLS OXIMETRY MLT: CPT

## 2020-05-19 PROCEDURE — 25000003 PHARM REV CODE 250: Performed by: ANESTHESIOLOGY

## 2020-05-19 PROCEDURE — 36000708 HC OR TIME LEV III 1ST 15 MIN: Performed by: ORTHOPAEDIC SURGERY

## 2020-05-19 PROCEDURE — C1713 ANCHOR/SCREW BN/BN,TIS/BN: HCPCS | Performed by: ORTHOPAEDIC SURGERY

## 2020-05-19 PROCEDURE — 37000009 HC ANESTHESIA EA ADD 15 MINS: Performed by: ORTHOPAEDIC SURGERY

## 2020-05-19 PROCEDURE — 25000242 PHARM REV CODE 250 ALT 637 W/ HCPCS: Performed by: ANESTHESIOLOGY

## 2020-05-19 PROCEDURE — 36000709 HC OR TIME LEV III EA ADD 15 MIN: Performed by: ORTHOPAEDIC SURGERY

## 2020-05-19 PROCEDURE — 64447 NJX AA&/STRD FEMORAL NRV IMG: CPT | Performed by: ANESTHESIOLOGY

## 2020-05-19 PROCEDURE — C9290 INJ, BUPIVACAINE LIPOSOME: HCPCS | Performed by: ORTHOPAEDIC SURGERY

## 2020-05-19 PROCEDURE — 37000008 HC ANESTHESIA 1ST 15 MINUTES: Performed by: ORTHOPAEDIC SURGERY

## 2020-05-19 PROCEDURE — 27201423 OPTIME MED/SURG SUP & DEVICES STERILE SUPPLY: Performed by: ORTHOPAEDIC SURGERY

## 2020-05-19 PROCEDURE — 64445 NJX AA&/STRD SCIATIC NRV IMG: CPT | Performed by: ANESTHESIOLOGY

## 2020-05-19 PROCEDURE — 81025 URINE PREGNANCY TEST: CPT | Performed by: ANESTHESIOLOGY

## 2020-05-19 PROCEDURE — 25000003 PHARM REV CODE 250: Performed by: ORTHOPAEDIC SURGERY

## 2020-05-19 PROCEDURE — 71000033 HC RECOVERY, INTIAL HOUR: Performed by: ORTHOPAEDIC SURGERY

## 2020-05-19 PROCEDURE — 71000016 HC POSTOP RECOV ADDL HR: Performed by: ORTHOPAEDIC SURGERY

## 2020-05-19 PROCEDURE — 94640 AIRWAY INHALATION TREATMENT: CPT

## 2020-05-19 PROCEDURE — 63600175 PHARM REV CODE 636 W HCPCS: Performed by: ANESTHESIOLOGY

## 2020-05-19 DEVICE — SCREW CORTEX 3.5MM X 12MM: Type: IMPLANTABLE DEVICE | Site: ANKLE | Status: FUNCTIONAL

## 2020-05-19 DEVICE — SCREW CORTEX 3.5MM X 18MM: Type: IMPLANTABLE DEVICE | Site: ANKLE | Status: FUNCTIONAL

## 2020-05-19 DEVICE — PLATE W COLLAR: Type: IMPLANTABLE DEVICE | Site: ANKLE | Status: FUNCTIONAL

## 2020-05-19 DEVICE — SCREW CORTEX 3.5MM X 16MM: Type: IMPLANTABLE DEVICE | Site: ANKLE | Status: FUNCTIONAL

## 2020-05-19 DEVICE — SCREW CANN 4.0MM 46MM: Type: IMPLANTABLE DEVICE | Site: ANKLE | Status: FUNCTIONAL

## 2020-05-19 DEVICE — SCREW CORTEX 3.5MM X 14MM.: Type: IMPLANTABLE DEVICE | Site: ANKLE | Status: FUNCTIONAL

## 2020-05-19 RX ORDER — ROPIVACAINE HYDROCHLORIDE 5 MG/ML
INJECTION, SOLUTION EPIDURAL; INFILTRATION; PERINEURAL
Status: DISCONTINUED | OUTPATIENT
Start: 2020-05-19 | End: 2020-05-19

## 2020-05-19 RX ORDER — ALBUTEROL SULFATE 0.83 MG/ML
2.5 SOLUTION RESPIRATORY (INHALATION)
Status: COMPLETED | OUTPATIENT
Start: 2020-05-19 | End: 2020-05-19

## 2020-05-19 RX ORDER — ONDANSETRON 2 MG/ML
INJECTION INTRAMUSCULAR; INTRAVENOUS
Status: DISCONTINUED | OUTPATIENT
Start: 2020-05-19 | End: 2020-05-19

## 2020-05-19 RX ORDER — ACETAMINOPHEN 500 MG
1000 TABLET ORAL
Status: COMPLETED | OUTPATIENT
Start: 2020-05-19 | End: 2020-05-19

## 2020-05-19 RX ORDER — ONDANSETRON 2 MG/ML
4 INJECTION INTRAMUSCULAR; INTRAVENOUS DAILY PRN
Status: DISCONTINUED | OUTPATIENT
Start: 2020-05-19 | End: 2020-05-19 | Stop reason: HOSPADM

## 2020-05-19 RX ORDER — DEXAMETHASONE SODIUM PHOSPHATE 4 MG/ML
INJECTION, SOLUTION INTRA-ARTICULAR; INTRALESIONAL; INTRAMUSCULAR; INTRAVENOUS; SOFT TISSUE
Status: DISCONTINUED | OUTPATIENT
Start: 2020-05-19 | End: 2020-05-19

## 2020-05-19 RX ORDER — PROPOFOL 10 MG/ML
VIAL (ML) INTRAVENOUS
Status: DISCONTINUED | OUTPATIENT
Start: 2020-05-19 | End: 2020-05-19

## 2020-05-19 RX ORDER — LIDOCAINE HYDROCHLORIDE 10 MG/ML
0.5 INJECTION, SOLUTION EPIDURAL; INFILTRATION; INTRACAUDAL; PERINEURAL ONCE
Status: DISCONTINUED | OUTPATIENT
Start: 2020-05-19 | End: 2020-05-19 | Stop reason: HOSPADM

## 2020-05-19 RX ORDER — LIDOCAINE HCL/PF 100 MG/5ML
SYRINGE (ML) INTRAVENOUS
Status: DISCONTINUED | OUTPATIENT
Start: 2020-05-19 | End: 2020-05-19

## 2020-05-19 RX ORDER — OXYCODONE HYDROCHLORIDE 5 MG/1
10 TABLET ORAL EVERY 4 HOURS PRN
Status: CANCELLED | OUTPATIENT
Start: 2020-05-19

## 2020-05-19 RX ORDER — FENTANYL CITRATE 50 UG/ML
100 INJECTION, SOLUTION INTRAMUSCULAR; INTRAVENOUS EVERY 5 MIN PRN
Status: COMPLETED | OUTPATIENT
Start: 2020-05-19 | End: 2020-05-19

## 2020-05-19 RX ORDER — HYDROCODONE BITARTRATE AND ACETAMINOPHEN 5; 325 MG/1; MG/1
1 TABLET ORAL EVERY 4 HOURS PRN
Status: CANCELLED | OUTPATIENT
Start: 2020-05-19

## 2020-05-19 RX ORDER — HYDROMORPHONE HYDROCHLORIDE 2 MG/ML
0.4 INJECTION, SOLUTION INTRAMUSCULAR; INTRAVENOUS; SUBCUTANEOUS EVERY 5 MIN PRN
Status: DISCONTINUED | OUTPATIENT
Start: 2020-05-19 | End: 2020-05-19 | Stop reason: HOSPADM

## 2020-05-19 RX ORDER — DIPHENHYDRAMINE HYDROCHLORIDE 50 MG/ML
12.5 INJECTION INTRAMUSCULAR; INTRAVENOUS EVERY 30 MIN PRN
Status: DISCONTINUED | OUTPATIENT
Start: 2020-05-19 | End: 2020-05-19 | Stop reason: HOSPADM

## 2020-05-19 RX ORDER — PREGABALIN 50 MG/1
50 CAPSULE ORAL ONCE
Status: COMPLETED | OUTPATIENT
Start: 2020-05-19 | End: 2020-05-19

## 2020-05-19 RX ORDER — GLYCOPYRROLATE 0.2 MG/ML
INJECTION INTRAMUSCULAR; INTRAVENOUS
Status: DISCONTINUED | OUTPATIENT
Start: 2020-05-19 | End: 2020-05-19

## 2020-05-19 RX ORDER — CEFAZOLIN SODIUM 1 G/3ML
2 INJECTION, POWDER, FOR SOLUTION INTRAMUSCULAR; INTRAVENOUS
Status: COMPLETED | OUTPATIENT
Start: 2020-05-19 | End: 2020-05-19

## 2020-05-19 RX ORDER — SODIUM CHLORIDE, SODIUM LACTATE, POTASSIUM CHLORIDE, CALCIUM CHLORIDE 600; 310; 30; 20 MG/100ML; MG/100ML; MG/100ML; MG/100ML
INJECTION, SOLUTION INTRAVENOUS CONTINUOUS
Status: DISCONTINUED | OUTPATIENT
Start: 2020-05-19 | End: 2020-05-19 | Stop reason: HOSPADM

## 2020-05-19 RX ORDER — SODIUM CHLORIDE 0.9 % (FLUSH) 0.9 %
3 SYRINGE (ML) INJECTION
Status: DISCONTINUED | OUTPATIENT
Start: 2020-05-19 | End: 2020-05-19 | Stop reason: HOSPADM

## 2020-05-19 RX ORDER — ONDANSETRON 2 MG/ML
4 INJECTION INTRAMUSCULAR; INTRAVENOUS EVERY 12 HOURS PRN
Status: CANCELLED | OUTPATIENT
Start: 2020-05-19

## 2020-05-19 RX ORDER — ONDANSETRON 4 MG/1
8 TABLET, ORALLY DISINTEGRATING ORAL EVERY 8 HOURS PRN
Qty: 20 TABLET | Refills: 1 | Status: ON HOLD | OUTPATIENT
Start: 2020-05-19 | End: 2021-05-30 | Stop reason: HOSPADM

## 2020-05-19 RX ORDER — HYDROCODONE BITARTRATE AND ACETAMINOPHEN 10; 325 MG/1; MG/1
1 TABLET ORAL EVERY 4 HOURS PRN
Qty: 40 TABLET | Refills: 0 | Status: ON HOLD | OUTPATIENT
Start: 2020-05-19 | End: 2021-05-30 | Stop reason: HOSPADM

## 2020-05-19 RX ORDER — OXYCODONE HYDROCHLORIDE 5 MG/1
5 TABLET ORAL
Status: DISCONTINUED | OUTPATIENT
Start: 2020-05-19 | End: 2020-05-19 | Stop reason: HOSPADM

## 2020-05-19 RX ORDER — OXYCODONE HYDROCHLORIDE 5 MG/1
5 TABLET ORAL ONCE
Status: COMPLETED | OUTPATIENT
Start: 2020-05-19 | End: 2020-05-19

## 2020-05-19 RX ORDER — MEPERIDINE HYDROCHLORIDE 25 MG/ML
12.5 INJECTION INTRAMUSCULAR; INTRAVENOUS; SUBCUTANEOUS ONCE AS NEEDED
Status: DISCONTINUED | OUTPATIENT
Start: 2020-05-19 | End: 2020-05-19 | Stop reason: HOSPADM

## 2020-05-19 RX ORDER — MIDAZOLAM HYDROCHLORIDE 1 MG/ML
2 INJECTION INTRAMUSCULAR; INTRAVENOUS
Status: COMPLETED | OUTPATIENT
Start: 2020-05-19 | End: 2020-05-19

## 2020-05-19 RX ORDER — BUPIVACAINE HYDROCHLORIDE 2.5 MG/ML
INJECTION, SOLUTION EPIDURAL; INFILTRATION; INTRACAUDAL
Status: DISCONTINUED | OUTPATIENT
Start: 2020-05-19 | End: 2020-05-19 | Stop reason: HOSPADM

## 2020-05-19 RX ADMIN — ONDANSETRON 4 MG: 2 INJECTION INTRAMUSCULAR; INTRAVENOUS at 10:05

## 2020-05-19 RX ADMIN — MIDAZOLAM HYDROCHLORIDE 2 MG: 1 INJECTION, SOLUTION INTRAMUSCULAR; INTRAVENOUS at 09:05

## 2020-05-19 RX ADMIN — SODIUM CHLORIDE, SODIUM LACTATE, POTASSIUM CHLORIDE, AND CALCIUM CHLORIDE: 600; 310; 30; 20 INJECTION, SOLUTION INTRAVENOUS at 09:05

## 2020-05-19 RX ADMIN — PREGABALIN 50 MG: 50 CAPSULE ORAL at 07:05

## 2020-05-19 RX ADMIN — ROPIVACAINE HYDROCHLORIDE 10 ML: 5 INJECTION, SOLUTION EPIDURAL; INFILTRATION; PERINEURAL at 09:05

## 2020-05-19 RX ADMIN — DEXAMETHASONE SODIUM PHOSPHATE 8 MG: 4 INJECTION, SOLUTION INTRAMUSCULAR; INTRAVENOUS at 10:05

## 2020-05-19 RX ADMIN — FENTANYL CITRATE 100 MCG: 50 INJECTION, SOLUTION INTRAMUSCULAR; INTRAVENOUS at 09:05

## 2020-05-19 RX ADMIN — ALBUTEROL SULFATE 2.5 MG: 2.5 SOLUTION RESPIRATORY (INHALATION) at 07:05

## 2020-05-19 RX ADMIN — LIDOCAINE HYDROCHLORIDE 100 MG: 20 INJECTION, SOLUTION INTRAVENOUS at 10:05

## 2020-05-19 RX ADMIN — GLYCOPYRROLATE 0.2 MG: 0.2 INJECTION, SOLUTION INTRAMUSCULAR; INTRAVENOUS at 10:05

## 2020-05-19 RX ADMIN — OXYCODONE HYDROCHLORIDE 5 MG: 5 TABLET ORAL at 01:05

## 2020-05-19 RX ADMIN — FENTANYL CITRATE 50 MCG: 50 INJECTION, SOLUTION INTRAMUSCULAR; INTRAVENOUS at 10:05

## 2020-05-19 RX ADMIN — ACETAMINOPHEN 1000 MG: 500 TABLET, FILM COATED ORAL at 07:05

## 2020-05-19 RX ADMIN — PROPOFOL 170 MG: 10 INJECTION, EMULSION INTRAVENOUS at 10:05

## 2020-05-19 RX ADMIN — ROPIVACAINE HYDROCHLORIDE 20 ML: 5 INJECTION, SOLUTION EPIDURAL; INFILTRATION; PERINEURAL at 09:05

## 2020-05-19 RX ADMIN — CEFAZOLIN 2 G: 330 INJECTION, POWDER, FOR SOLUTION INTRAMUSCULAR; INTRAVENOUS at 10:05

## 2020-05-19 RX ADMIN — OXYCODONE HYDROCHLORIDE 5 MG: 5 TABLET ORAL at 08:05

## 2020-05-19 NOTE — OR NURSING
Patient in a lot of pain. Dr Raphael notified. New orders for percolone 5mg once received and carried out.

## 2020-05-19 NOTE — ANESTHESIA POSTPROCEDURE EVALUATION
Anesthesia Post Evaluation    Patient: Toño Cavanaugh    Procedure(s) Performed: Procedure(s) (LRB):  ORIF, ANKLE - right (Right)    Final Anesthesia Type: general    Patient location during evaluation: PACU  Patient participation: Yes- Able to Participate  Level of consciousness: awake and alert  Post-procedure vital signs: reviewed and stable  Pain management: adequate  Airway patency: patent    PONV status at discharge: No PONV  Anesthetic complications: no      Cardiovascular status: blood pressure returned to baseline and stable  Respiratory status: unassisted, spontaneous ventilation and room air  Hydration status: euvolemic  Follow-up not needed.  Comments: EXTREME anxiety pre-op/panic attack; however, extremely smooth post op course.          Vitals Value Taken Time   /76 5/19/2020 12:31 PM   Temp 36.6 °C (97.8 °F) 5/19/2020  7:54 AM   Pulse 68 5/19/2020 12:41 PM   Resp 18 5/19/2020  7:54 AM   SpO2 100 % 5/19/2020 12:41 PM   Vitals shown include unvalidated device data.      No case tracking events are documented in the log.      Pain/Anson Score: Pain Rating Prior to Med Admin: 9 (5/19/2020  8:53 AM)

## 2020-05-19 NOTE — PLAN OF CARE
Toño Cavanaugh has met all discharge criteria from Phase II. Vital Signs are stable, ambulating  without difficulty.Pain is now under control and tolerable for the pt. Pain score 0 at this time.  Discharge instructions given, patient verbalized understanding. Discharged from facility via wheelchair in stable condition.

## 2020-05-19 NOTE — OP NOTE
[facility]  Surgery Department  Operative Note    SUMMARY     Date of Procedure: 5/19/2020     Procedure:   1.  Open reduction internal fixation right trimalleolar ankle fracture    Surgeon(s) and Role:     * Bobby Spencer MD - Primary    Assistant: Shahida Winston CST     Pre-Operative Diagnosis:  1.  Right trimalleolar ankle fracture    Post-Operative Diagnosis:   Same    Anesthesia: General + regional + local    Technical Procedures Used: Arnulfo is a 24-year-old who was taken to the operating room 05/19/2020 for planned open reduction internal fixation of a displaced right trimalleolar ankle fracture.  2 g of Ancef as well as a regional block were given preoperatively.  The patient was placed in the supine position.  General tracheal anesthesia was administered.  After prepping and draping in the usual sterile fashion a time-out procedure was performed identifying the right ankle as the surgical site.  Time-out was performed.    After the Esmarch was used exsanguinate the limb the tourniquet was insufflated to 300 mm of mercury we 1st turned our attention to the lateral ankle.    Preoperatively a bump had been placed under the right hip.  A longitudinal incision from the tip of the lateral malleolus along the fibular shaft was made.  Skin and subcutaneous tissue were sharply incised.  The oblique fracture was identified and a Taconite was used to clean out any debris.  A Lion jaw reduction clamp was used to anatomically reduce the fracture.  A 3.5 mm lag screw was placed from anterior superior to posterior inferior with good purchase.  The reduction clamp was removed.  A Synthes 7 hole 1/3 tubular plate was then placed.  All screw holes were filled with standard screws with good purchase.  Fluoroscopic images showed excellent reduction of the fracture.  The syndesmosis was then stressed and found to be stable so no syndesmotic fixation was deemed necessary.  The wound was then copiously  irrigated.  The deep tissue layer was closed with 0 Vicryl subcutaneous tissue closed with 2 Vicryl the skin was closed with 4 0 Monocryl and Dermabond.    We then turned our attention to the medial ankle.  The bump was removed from the right hip and a curvilinear incision over the medial malleolus was made.  Skin and subcutaneous tissue were sharply incised.  The soft tissue on the medial malleolus was elevated anteriorly and posteriorly.  A K-wire was used to create a small hole proximal to the fracture and a pointed reduction clamp was used to anatomically reduce the fracture which was easily visualized.  Two K-wires were then placed followed by cannulated drill and 2, 4.0 mm long thread partially-threaded 44 mm screws were placed.  Fluoroscopic images were taken which showed the screws were bit inferior.  I was concerned there may be some impingement medially so the screws were removed the K-wires were replaced a bit more proximally and confirmed with fluoroscopy.  The cannulated drill was again used in the 2, 44 mm 4.0 mm long thread cannulated screws were placed with excellent position and reduction of the fracture.  The medial wound was irrigated.  The deep tissues were closed with 0 Vicryl subcutaneous tissue closed with 2 Vicryl the skin was closed with Monocryl and Dermabond.  Soft dressing was applied followed by a boot.  The patient was then extubated in the operating room and taken to the PACU without complication.    Description of the Findings of the Procedure:  Mildly displaced lateral malleolus/medial malleolus fractures    Significant Surgical Tasks Conducted by the Assistant(s), if Applicable:  Positioning, prepping, draping, bandage    Complications: No    Estimated Blood Loss (EBL): 12cc           Implants:   Implant Name Type Inv. Item Serial No.  Lot No. LRB No. Used   SCREW CORTEX 3.5MM X 18MM - [id number]  SCREW CORTEX 3.5MM X 18MM  [facility]  Right 1   SCREW CORTEX 3.5MM X 12MM -  [id number]  SCREW CORTEX 3.5MM X 12MM  [employer]  Right 3   SCREW CORTEX 3.5MM X 14MM. - [id number]  SCREW CORTEX 3.5MM X 14MM.  [employer]  Right 1   PLATE W COLLAR - [id number]  PLATE W COLLAR  [employer]  Right 1   SCREW CORTEX 3.5MM X 16MM - [id number]  SCREW CORTEX 3.5MM X 16MM  [employer]  Right 3   KWIRE NTHRD [id number] - [id number]  KWIRE NTHRD [id number]  [employer]  Right 3   SCREW LIUDMILA 4.0MM 46MM - [id number]  SCREW LIUDMILA 4.0MM 46MM  [employer]  Right 2       Specimens:   Specimen (12h ago, onward)    None                  Condition: Good    Disposition: PACU - hemodynamically stable.    Attestation: I was present and scrubbed for the entire procedure.    Discharge Note    SUMMARY     Admit Date: 5/19/2020    Discharge Date and Time:  05/19/2020 12:05 PM    Hospital Course (synopsis of major diagnoses, care, treatment, and services provided during the course of the hospital stay): outpt     Final Diagnosis: Post-Op Diagnosis Codes:     * Closed trimalleolar fracture of right ankle, initial encounter [S82.851A]    Disposition: Home or Self Care    Follow Up/Patient Instructions:     Medications:  Reconciled Home Medications:      Medication List      START taking these medications    ondansetron 4 MG Tbdl  Commonly known as:  ZOFRAN-ODT  Dissolve 2 tablets (8 mg total) by mouth every 8 (eight) hours as needed.        CHANGE how you take these medications    * HYDROcodone-acetaminophen  mg per tablet  Commonly known as:  NORCO  Take 1 tablet by mouth every 4 (four) hours as needed for Pain.  What changed:  Another medication with the same name was added. Make sure you understand how and when to take each.     * HYDROcodone-acetaminophen  mg per tablet  Commonly known as:  NORCO  Take 1 tablet by mouth every 4 (four) hours as needed.  What changed:  You were already taking a medication with the same name, and this prescription was added. Make sure you understand how and when to take each.         * This list has  "2 medication(s) that are the same as other medications prescribed for you. Read the directions carefully, and ask your doctor or other care provider to review them with you.            CONTINUE taking these medications    nicotine 7 mg/24 hr  Commonly known as:  NICODERM CQ  Place 1 patch onto the skin once daily.     ondansetron 4 MG tablet  Commonly known as:  ZOFRAN  Take 8 mg by mouth 2 (two) times daily.     valACYclovir 1000 MG tablet  Commonly known as:  VALTREX  Take 2 tablets (2,000 mg total) by mouth every 12 (twelve) hours as needed (herpes outbreak). Take for 1 day at first symptom of outbreak          Discharge Procedure Orders   CRUTCHES FOR HOME USE   Order Comments: Disregard if she has them already.     Order Specific Question Answer Comments   Type: Axillary    Height: 5' 5" (1.651 m)    Weight: 54.4 kg (120 lb)    Length of need (1-99 months): 2      Diet general     Call MD for:  temperature >100.4     Call MD for:  persistent nausea and vomiting     Call MD for:  severe uncontrolled pain     Call MD for:  difficulty breathing, headache or visual disturbances     Call MD for:  redness, tenderness, or signs of infection (pain, swelling, redness, odor or green/yellow discharge around incision site)     Call MD for:  hives     Call MD for:  persistent dizziness or light-headedness     Call MD for:  extreme fatigue     Ice to affected area     Keep surgical extremity elevated     Remove dressing in 72 hours   Order Comments: Then change daily.  Keep clean, dry and covered.     Non weight bearing   Order Comments: Nonweightbearing right lower extremity     Follow-up Information     Bobby Barrow MD. Schedule an appointment as soon as possible for a visit in 10 days.    Specialty:  Orthopedic Surgery  Why:  For suture removal, For wound re-check  Contact information:  [address] [place] [id number] [phone]                 "

## 2020-05-19 NOTE — DISCHARGE INSTRUCTIONS
Your Surgeon Gave You EXPAREL® (bupivacaine liposome injectable suspension)    To help control your pain after surgery, your surgeon injected EXPAREL into your surgical incision just before the end of the procedure.   EXPAREL is a local analgesic that contains the local anesthetic bupivacaine. Local anesthetics provide pain relief by numbing the tissue  around the surgical site.   EXPAREL is specifically designed to release pain medication over time and can control pain for up to 72 hours.   In addition to EXPAREL, your surgeon may provide other pain medications to control your pain.   Each patient is different and responds differently to painmedication. Depending on how you respond to EXPAREL, you may require less  additional pain medication during your recovery.    Possible Side Effects    Side effects can occur with any medication and it is important not to ignore anything you may be experiencing. Some patients who  received EXPAREL experienced nausea, vomiting, or constipation. Rarely, patients who receive bupivacaine (the active ingredient in  EXPAREL) have experienced numbness and tingling in their mouth or lips, lightheadedness, or anxiety. Speak with your doctor right  away if you think you may be experiencing any of these sensations, or if you have other questions regarding possible side effects.    Your Recovery    When your pain is under control, your body can better focus on healing. This is not the time to test your pain tolerance, or grin and  bear it.Work with your surgeon and nurse to make your recovery as speedy and pain-free as possible.   Follow the post-op orders your nurse gave you.   Eat a healthy diet and drink plenty of water. Surgery stresses your body; your body responds by needing more energy to heal.      Important Information About EXPAREL  Products that contain bupivacaine, like EXPAREL, may cause a temporary loss of  sensation or the ability to move in the area where  bupivacaine was injected.    Date Administered: 5-  Time Administered: 11:50 AM    Other Forms of Bupivicaine should not be administered within 96hrs following administration of EXPAREL.   Bupivacaine Liposomal Suspension for Injection                Discharge Instructions: Using Crutches (Non-Weight-Bearing)  Your healthcare provider has prescribed crutches for you. A healthy leg can support your body weight, but when you have an injured leg or foot, you need to keep weight off it. The swing to method of walking, sometimes called gait, is easy to learn and takes less arm strength and balance. The swing through gait takes more practice, but it moves you farther with each step and is less tiring overall. Start with swing to and progress to swing through when instructed.      Before you use crutches  Be prepared:  · Remove throw rugs, electrical cords, and anything else that may cause you to fall.  · Arrange your household to keep the items you need handy. Keep everything else out of the way.  · Find a backpack, hayley pack, or apron, or use pockets to carry things. This will help you keep your hands free.  Standing with crutches  Use the balanced standing (tripod) position when you start or end a movement. Also use it whenever youre standing for a length of time.  · Move your crutches in front of you about 12 inches.  · Hold the injured (or weaker) foot off the floor.  · Find your balance.  · Be sure not to rest your armpits on the pads of the crutches.  Walking with crutches  Tips include the following:   · Start in a balanced standing (tripod) position.  · Squeeze the crutch pads against the sides of your chest.  · The bottom tips of the crutches should be wide enough apart for you to move easily between them.  · Support your weight on your hands, not on your armpits.  Swing to gait  · With your crutches in front of you, press down on the handgrips.  · Lift your good (stronger) foot and swing your  body up to the crutches.  · Land on your good foot, between your crutches.  · Keep the knee of your injured or weaker foot slightly bent.  · Reach forward and out with the crutches to begin the next step.  Swing through gait  · With your crutches in front of you, press down on the handgrips.  · Lift your good (stronger) foot and swing your body through the crutches.  · Land on your good foot, about 12 inches in front of the crutches.  · Keep the knee of your injured leg slightly bent.  · Reach forward and out with the crutches to begin the next step.  Follow-up  Make a follow-up appointment as directed by your healthcare provider.     When to call your healthcare provider  Call your healthcare provider right away if you have any of the following:  · Sudden or increased shortness of breath  · Sudden chest pain  · Fever above 100.4°F (38.0°C)  · Increasing redness, tenderness, or swelling at the incision site or in the injured limb  · Drainage from the incision or injured limb  · Opening of the incision or injury  · Localized chest pain with coughing   Date Last Reviewed: 8/1/2016  © 6682-1849 [employer]. [address] [place] [address]. All rights reserved. This information is not intended as a substitute for professional medical care. Always follow your healthcare professional's instructions.        Discharge Instructions: After Your Surgery  Youve just had surgery. During surgery, you were given medicine called anesthesia to keep you relaxed and free of pain. After surgery, you may have some pain or nausea. This is common. Here are some tips for feeling better and getting well after surgery.     Stay on schedule with your medicine.   Going home  Your healthcare provider will show you how to take care of yourself when you go home. He or she will also answer your questions. Have an adult family member or friend drive you home. For the first 24 hours after your surgery:  · Do not drive or use  heavy equipment.  · Do not make important decisions or sign legal papers.  · Do not drink alcohol.  · Have someone stay with you, if needed. He or she can watch for problems and help keep you safe.  Be sure to go to all follow-up visits with your healthcare provider. And rest after your surgery for as long as your healthcare provider tells you to.  Coping with pain  If you have pain after surgery, pain medicine will help you feel better. Take it as told, before pain becomes severe. Also, ask your healthcare provider or pharmacist about other ways to control pain. This might be with heat, ice, or relaxation. And follow any other instructions your surgeon or nurse gives you.  Tips for taking pain medicine  To get the best relief possible, remember these points:  · Pain medicines can upset your stomach. Taking them with a little food may help.  · Most pain relievers taken by mouth need at least 20 to 30 minutes to start to work.  · Taking medicine on a schedule can help you remember to take it. Try to time your medicine so that you can take it before starting an activity. This might be before you get dressed, go for a walk, or sit down for dinner.  · Constipation is a common side effect of pain medicines. Call your healthcare provider before taking any medicines such as laxatives or stool softeners to help ease constipation. Also ask if you should skip any foods. Drinking lots of fluids and eating foods such as fruits and vegetables that are high in fiber can also help. Remember, do not take laxatives unless your surgeon has prescribed them.  · Drinking alcohol and taking pain medicine can cause dizziness and slow your breathing. It can even be deadly. Do not drink alcohol while taking pain medicine.  · Pain medicine can make you react more slowly to things. Do not drive or run machinery while taking pain medicine.  Your healthcare provider may tell you to take acetaminophen to help ease your pain. Ask him or her how  much you are supposed to take each day. Acetaminophen or other pain relievers may interact with your prescription medicines or other over-the-counter (OTC) medicines. Some prescription medicines have acetaminophen and other ingredients. Using both prescription and OTC acetaminophen for pain can cause you to overdose. Read the labels on your OTC medicines with care. This will help you to clearly know the list of ingredients, how much to take, and any warnings. It may also help you not take too much acetaminophen. If you have questions or do not understand the information, ask your pharmacist or healthcare provider to explain it to you before you take the OTC medicine.  Managing nausea  Some people have an upset stomach after surgery. This is often because of anesthesia, pain, or pain medicine, or the stress of surgery. These tips will help you handle nausea and eat healthy foods as you get better. If you were on a special food plan before surgery, ask your healthcare provider if you should follow it while you get better. These tips may help:  · Do not push yourself to eat. Your body will tell you when to eat and how much.  · Start off with clear liquids and soup. They are easier to digest.  · Next try semi-solid foods, such as mashed potatoes, applesauce, and gelatin, as you feel ready.  · Slowly move to solid foods. Dont eat fatty, rich, or spicy foods at first.  · Do not force yourself to have 3 large meals a day. Instead eat smaller amounts more often.  · Take pain medicines with a small amount of solid food, such as crackers or toast, to avoid nausea.     Call your surgeon if  · You still have pain an hour after taking medicine. The medicine may not be strong enough.  · You feel too sleepy, dizzy, or groggy. The medicine may be too strong.  · You have side effects like nausea, vomiting, or skin changes, such as rash, itching, or hives.       If you have obstructive sleep apnea  You were given anesthesia  medicine during surgery to keep you comfortable and free of pain. After surgery, you may have more apnea spells because of this medicine and other medicines you were given. The spells may last longer than usual.   At home:  · Keep using the continuous positive airway pressure (CPAP) device when you sleep. Unless your healthcare provider tells you not to, use it when you sleep, day or night. CPAP is a common device used to treat obstructive sleep apnea.  · Talk with your provider before taking any pain medicine, muscle relaxants, or sedatives. Your provider will tell you about the possible dangers of taking these medicines.  Date Last Reviewed: 12/1/2016 © 2000-2017 [employer]. [address] [place] [address], [place] [address]. All rights reserved. This information is not intended as a substitute for professional medical care. Always follow your healthcare professional's instructions.    PLEASE FOLLOW ANY ADDITIONAL INSTRUCTIONS GIVEN TO YOU BY DR WILCOX!

## 2020-05-19 NOTE — ANESTHESIA PROCEDURE NOTES
Adductor canal    Patient location during procedure: holding area   Block not for primary anesthetic.  Reason for block: at surgeon's request and post-op pain management   Post-op Pain Location: knee pain  Start time: 5/19/2020 9:52 AM  Timeout: 5/19/2020 9:45 AM   End time: 5/19/2020 9:59 AM    Staffing  Authorizing Provider: Rachel Raphael MD  Performing Provider: Rachel Raphael MD    Preanesthetic Checklist  Completed: patient identified, site marked, surgical consent, pre-op evaluation, timeout performed, IV checked, risks and benefits discussed and monitors and equipment checked  Peripheral Block  Patient position: supine  Prep: ChloraPrep  Patient monitoring: heart rate, cardiac monitor, continuous pulse ox and frequent blood pressure checks  Block type: adductor canal  Laterality: right  Injection technique: single shot  Needle  Needle type: Stimuplex   Needle gauge: 22 G  Needle length: 3.5 in  Needle localization: nerve stimulator and ultrasound guidance   -ultrasound image captured on disc.  Assessment  Injection assessment: negative aspiration, negative parasthesia and local visualized surrounding nerve  Paresthesia pain: none  Heart rate change: no  Slow fractionated injection: yes

## 2020-05-19 NOTE — ANESTHESIA PROCEDURE NOTES
Pop block    Patient location during procedure: holding area   Block not for primary anesthetic.  Reason for block: at surgeon's request and post-op pain management   Post-op Pain Location: R ankle pain  Start time: 5/19/2020 9:46 AM  Timeout: 5/19/2020 9:45 AM   End time: 5/19/2020 9:51 AM    Staffing  Authorizing Provider: Rachel Raphael MD  Performing Provider: Rachel Raphael MD    Preanesthetic Checklist  Completed: patient identified, site marked, surgical consent, pre-op evaluation, timeout performed, IV checked, risks and benefits discussed and monitors and equipment checked  Peripheral Block  Patient position: supine  Prep: ChloraPrep  Patient monitoring: heart rate, cardiac monitor, continuous pulse ox and frequent blood pressure checks  Block type: popliteal  Laterality: right  Injection technique: single shot  Needle  Needle type: short-bevel   Needle gauge: 22 G  Needle length: 3.5 in  Needle localization: nerve stimulator and ultrasound guidance   -ultrasound image captured on disc.  Assessment  Injection assessment: negative aspiration, negative parasthesia and local visualized surrounding nerve  Paresthesia pain: none  Heart rate change: no  Slow fractionated injection: yes

## 2020-05-19 NOTE — PLAN OF CARE
"Pt requests IV to be removed prior to getting out of bed because she has a "needle phobia".  IV removed  "

## 2020-05-19 NOTE — TRANSFER OF CARE
"Anesthesia Transfer of Care Note    Patient: Toño Cavanaugh    Procedure(s) Performed: Procedure(s) (LRB):  ORIF, ANKLE - right (Right)    Patient location: PACU    Anesthesia Type: general    Transport from OR: Transported from OR on 2-3 L/min O2 by NC with adequate spontaneous ventilation    Post pain: adequate analgesia    Post assessment: no apparent anesthetic complications and tolerated procedure well    Post vital signs: stable    Level of consciousness: awake, alert and oriented    Nausea/Vomiting: no nausea/vomiting    Complications: none    Transfer of care protocol was followed      Last vitals:   Visit Vitals  /68 (BP Location: Right arm, Patient Position: Lying)   Pulse 64   Temp 36.6 °C (97.8 °F) (Oral)   Resp 18   Ht 5' 5" (1.651 m)   Wt 54.4 kg (120 lb)   LMP  (LMP Unknown)   SpO2 99%   Breastfeeding? No   BMI 19.97 kg/m²     "

## 2020-05-19 NOTE — OR NURSING
Pt AAOx4, VSS on RA. Pt with tactile sensation on RLE toes, post nerve block and local exparel. Surgical dressing c/d/i and stabilized with boot . Pt denies any pain. Demerol 12.5mg IVP administered for post-anesthesia tremors. Pt in good spirits upon transfer to ACU. Pt's partner, Edvin, and pt's mother updated on POC.

## 2020-05-23 ENCOUNTER — HOSPITAL ENCOUNTER (EMERGENCY)
Facility: OTHER | Age: 25
Discharge: HOME | End: 2020-05-23
Attending: EMERGENCY MEDICINE
Payer: COMMERCIAL

## 2020-05-23 VITALS
OXYGEN SATURATION: 99 % | HEART RATE: 86 BPM | RESPIRATION RATE: 18 BRPM | DIASTOLIC BLOOD PRESSURE: 84 MMHG | TEMPERATURE: 99 F | SYSTOLIC BLOOD PRESSURE: 125 MMHG

## 2020-05-23 DIAGNOSIS — K59.00 CONSTIPATION, UNSPECIFIED CONSTIPATION TYPE: ICD-10-CM

## 2020-05-23 DIAGNOSIS — R11.2 NAUSEA AND VOMITING, INTRACTABILITY OF VOMITING NOT SPECIFIED, UNSPECIFIED VOMITING TYPE: ICD-10-CM

## 2020-05-23 DIAGNOSIS — G89.18 POST-OPERATIVE PAIN: Primary | ICD-10-CM

## 2020-05-23 PROCEDURE — 99284 EMERGENCY DEPT VISIT MOD MDM: CPT

## 2020-05-23 PROCEDURE — 25000003 PHARM REV CODE 250: Performed by: PHYSICIAN ASSISTANT

## 2020-05-23 RX ORDER — IBUPROFEN 600 MG/1
600 TABLET ORAL EVERY 6 HOURS PRN
Qty: 20 TABLET | Refills: 0 | Status: SHIPPED | OUTPATIENT
Start: 2020-05-23 | End: 2020-05-23 | Stop reason: SDUPTHER

## 2020-05-23 RX ORDER — ONDANSETRON 4 MG/1
4 TABLET, ORALLY DISINTEGRATING ORAL EVERY 8 HOURS PRN
Qty: 12 TABLET | Refills: 0 | Status: SHIPPED | OUTPATIENT
Start: 2020-05-23 | End: 2020-05-23 | Stop reason: SDUPTHER

## 2020-05-23 RX ORDER — ONDANSETRON 4 MG/1
4 TABLET, ORALLY DISINTEGRATING ORAL EVERY 8 HOURS PRN
Qty: 12 TABLET | Refills: 0 | Status: ON HOLD | OUTPATIENT
Start: 2020-05-23 | End: 2021-05-30 | Stop reason: HOSPADM

## 2020-05-23 RX ORDER — ACETAMINOPHEN 500 MG
1000 TABLET ORAL
Status: COMPLETED | OUTPATIENT
Start: 2020-05-23 | End: 2020-05-23

## 2020-05-23 RX ORDER — KETOROLAC TROMETHAMINE 10 MG/1
10 TABLET, FILM COATED ORAL
Status: COMPLETED | OUTPATIENT
Start: 2020-05-23 | End: 2020-05-23

## 2020-05-23 RX ORDER — IBUPROFEN 600 MG/1
600 TABLET ORAL EVERY 6 HOURS PRN
Qty: 20 TABLET | Refills: 0 | Status: ON HOLD | OUTPATIENT
Start: 2020-05-23 | End: 2021-05-30 | Stop reason: HOSPADM

## 2020-05-23 RX ORDER — DOCUSATE SODIUM 100 MG/1
100 CAPSULE, LIQUID FILLED ORAL 2 TIMES DAILY
Qty: 60 CAPSULE | Refills: 0 | Status: SHIPPED | OUTPATIENT
Start: 2020-05-23 | End: 2021-12-03

## 2020-05-23 RX ADMIN — ACETAMINOPHEN 1000 MG: 500 TABLET ORAL at 12:05

## 2020-05-23 RX ADMIN — KETOROLAC TROMETHAMINE 10 MG: 10 TABLET, FILM COATED ORAL at 12:05

## 2020-05-23 NOTE — ED PROVIDER NOTES
"Encounter Date: 5/23/2020       History     Chief Complaint   Patient presents with    pain mgmt      pt reports ankle sx on Tuesday, ran out of pain medication. pt states " I need to get on a pain management to figure out how to control this pain "      Patient is 24 year old female who presents with complaints of right ankle pain after surgery on Tuesday 5/19/2020 by Johanna. She reports that she has been taking double doses of her prescribed narcotic pain medication (norco 10 mg) with minimal management of pain. She has also been taking antiemetic without NSAIDs. She denies associated fever, chills, nausea, vomiting, chest pain or SOB. She is currently unaccompanied in the ER. Of note she reports that she is very anxious and admits that the pain is  "not that bad" but she reports that she is so worried about the pain and admits that "I just need a pain management plan". She reports that her follow-up visit with Dr. Spencer is on 6/3/202. She admits she has not taken the dressing off of her ankle yet. She admits she has not looked at the wounds. She has been taking the boot off periodically but admits this causes pain. She denies other injury. She admits she tried to call Dr. Spencer's office PTA but she did not get any response. She reports her last dose of Norco was 3 hours PTA.         Review of patient's allergies indicates:   Allergen Reactions    Wool Rash     Past Medical History:   Diagnosis Date    ADHD (attention deficit hyperactivity disorder)     on adderall in past; prefers to manage without stimulant medication    Knee injury 2011, 2015    Major depression     Mood disorder     PTSD (post-traumatic stress disorder)      Past Surgical History:   Procedure Laterality Date    OPEN REDUCTION AND INTERNAL FIXATION (ORIF) OF INJURY OF ANKLE Right 5/19/2020    Procedure: ORIF, ANKLE - right;  Surgeon: Bobby Spencer MD;  Location: [facility];  Service: Orthopedics;  Laterality: Right;     Family " History   Problem Relation Age of Onset    Diabetes Maternal Grandmother     Hypertension Maternal Grandmother     Mental illness Maternal Grandmother         bipolar    Cancer Mother         melanoma, recurrent    Cancer Father         testicular cancer    Mental illness Father     Cancer Paternal Grandmother         skin cancer    Heart disease Paternal Grandfather         Mi in 70s    Breast cancer Neg Hx     Colon cancer Neg Hx     Ovarian cancer Neg Hx     Stroke Neg Hx      Social History     Tobacco Use    Smoking status: Current Every Day Smoker    Smokeless tobacco: Never Used    Tobacco comment: 4cig/day   Substance Use Topics    Alcohol use: Yes     Comment: 2-3 times/week, up to 3 drinks    Drug use: Yes     Frequency: 1.0 times per week     Types: Marijuana     Comment: daily MJ; no h/o IVDU or stimulant use     Review of Systems   Constitutional: Negative for fever.   HENT: Negative for sore throat.    Respiratory: Negative for shortness of breath.    Cardiovascular: Negative for chest pain.   Gastrointestinal: Negative for nausea.   Genitourinary: Negative for dysuria.   Musculoskeletal: Negative for back pain.        Right ankle pain    Skin: Negative for rash.   Neurological: Negative for weakness.   Hematological: Does not bruise/bleed easily.       Physical Exam     Initial Vitals [05/23/20 1136]   BP Pulse Resp Temp SpO2   (!) 140/82 96 18 98.8 °F (37.1 °C) 100 %      MAP       --         Physical Exam    Nursing note and vitals reviewed.  Constitutional: She appears well-developed and well-nourished.   Healthy appearing female in NAD but is quite anxious during interview and exam. She is in her home wheelchair, wearing a walking boot with post op dressing in place. Dressing is clean and dry externally.    HENT:   Head: Normocephalic and atraumatic.   Eyes: Conjunctivae and EOM are normal. Pupils are equal, round, and reactive to light. Right eye exhibits no discharge. Left eye  exhibits no discharge. No scleral icterus.   Neck: Normal range of motion.   Cardiovascular: Normal rate, regular rhythm and normal heart sounds.   Pulmonary/Chest: Breath sounds normal. She has no wheezes. She has no rhonchi. She has no rales.   Abdominal: Soft. Bowel sounds are normal. There is no tenderness. There is no rebound and no guarding.   Musculoskeletal:   Right lower extremity has well-healing surgical wounds to lateral and medial aspect of the ankle.  There is no purulence bleeding fluctuance or induration.  There is good distal pulse.  Range of motion is uncomfortable but patient is able to dorsiflex in order to put her foot back into her boot.  There is negative Homans sign.  Compartments are soft and nontender to the lower leg.   Lymphadenopathy:     She has no cervical adenopathy.   Neurological: She is alert and oriented to person, place, and time. She has normal strength. No cranial nerve deficit or sensory deficit. GCS score is 15. GCS eye subscore is 4. GCS verbal subscore is 5. GCS motor subscore is 6.   Skin: Skin is warm. Capillary refill takes less than 2 seconds. No rash and no abscess noted. No erythema.   Psychiatric: She has a normal mood and affect. Her behavior is normal. Thought content normal.         ED Course   Procedures  Labs Reviewed - No data to display       Imaging Results    None          Medical Decision Making:   ED Management:  Urgent evaluation of 24 year old female who presents with complaints of right ankle pain four days post op. She is afebrile, non-toxic appearing and hemodynamically stable. Physical exam outlined above and reveals well healing surgical wounds to the lateral and medial aspect of the right ankle. She has only mild edema without erythema or induration. There is no associated purulence and patient denies re-injury. She has taken 69.5 tablets of Hydrocodone-APAP  since 5/13 and is worried because she only has one dose left. She admits that she  has not had a bowel movement but admits she is also not eating and started vomiting last night despite antiemetics.  I have considered but do not suspect bowel obstruction she has a benign abdomen.  Will encourage her to take anti-inflammatory and Tylenol regimen rather than narcotic regimen and will prescribe Zofran and Colace as supportive help.  She is encouraged to elevate her right lower extremity and to follow-up with orthopedics within 1-2 days for symptom recheck.  She is educated on ED return precautions verbalized understanding.  There is no clinical evidence of compartment syndrome, acute muscle contracture or nerve compromise. She is reassured that there is no acute pathology suspected at this time that she is felt safe for discharge.  Toradol and Tylenol here in the emergency department does improve her pain.  Patient is amenable to nonnarcotic analgesia regimen.  She is educated on ED return precautions and verbalized understanding.  She is stable for discharge.                                 Clinical Impression:       ICD-10-CM ICD-9-CM   1. Post-operative pain G89.18 338.18   2. Constipation, unspecified constipation type K59.00 564.00   3. Nausea and vomiting, intractability of vomiting not specified, unspecified vomiting type R11.2 787.01                                Nelly Campbell PA-C  05/23/20 2395

## 2020-05-23 NOTE — ED NOTES
PT commented about PTSD symptoms and being unable to sleep, PA in room and is aware. PT crying, anxious upon exam.

## 2020-05-23 NOTE — ED NOTES
Pt here for pain management, r/t right foot, fracture and s/p surgery. Pt states pain meds running out, using more than first prescribed. PA in room for eval at this time. Will cont to monitor.

## 2020-08-06 ENCOUNTER — TELEPHONE (OUTPATIENT)
Dept: ENDOCRINOLOGY | Facility: CLINIC | Age: 25
End: 2020-08-06

## 2020-08-06 NOTE — TELEPHONE ENCOUNTER
----- Message from Janice Kathrine sent at 8/6/2020  4:16 PM CDT -----  Contact: [email or web address]    tel:  [phone]  Caller says she is getting [facility] for the time being .   Cx'd her appt. W/ you.

## 2020-12-30 ENCOUNTER — OFFICE VISIT (OUTPATIENT)
Dept: OPTOMETRY | Facility: CLINIC | Age: 25
End: 2020-12-30
Payer: COMMERCIAL

## 2020-12-30 DIAGNOSIS — H52.4 MYOPIA OF BOTH EYES WITH ASTIGMATISM AND PRESBYOPIA: Primary | ICD-10-CM

## 2020-12-30 DIAGNOSIS — H52.13 MYOPIA OF BOTH EYES WITH ASTIGMATISM AND PRESBYOPIA: Primary | ICD-10-CM

## 2020-12-30 DIAGNOSIS — H52.203 MYOPIA OF BOTH EYES WITH ASTIGMATISM AND PRESBYOPIA: Primary | ICD-10-CM

## 2020-12-30 PROCEDURE — 92004 COMPRE OPH EXAM NEW PT 1/>: CPT | Mod: S$GLB,,, | Performed by: OPTOMETRIST

## 2020-12-30 PROCEDURE — 99999 PR PBB SHADOW E&M-EST. PATIENT-LVL III: CPT | Mod: PBBFAC,,, | Performed by: OPTOMETRIST

## 2020-12-30 PROCEDURE — 92310 CONTACT LENS FITTING OU: CPT | Mod: CSM,,, | Performed by: OPTOMETRIST

## 2020-12-30 PROCEDURE — 92310 PR CONTACT LENS FITTING (NO CHANGE): ICD-10-PCS | Mod: CSM,,, | Performed by: OPTOMETRIST

## 2020-12-30 PROCEDURE — 99999 PR PBB SHADOW E&M-EST. PATIENT-LVL III: ICD-10-PCS | Mod: PBBFAC,,, | Performed by: OPTOMETRIST

## 2020-12-30 PROCEDURE — 92015 PR REFRACTION: ICD-10-PCS | Mod: S$GLB,,, | Performed by: OPTOMETRIST

## 2020-12-30 PROCEDURE — 92015 DETERMINE REFRACTIVE STATE: CPT | Mod: S$GLB,,, | Performed by: OPTOMETRIST

## 2020-12-30 PROCEDURE — 92004 PR EYE EXAM, NEW PATIENT,COMPREHESV: ICD-10-PCS | Mod: S$GLB,,, | Performed by: OPTOMETRIST

## 2020-12-30 RX ORDER — TESTOSTERONE 50 MG/5G
GEL TRANSDERMAL
COMMUNITY
Start: 2020-09-23 | End: 2021-12-03

## 2020-12-30 NOTE — PROGRESS NOTES
HPI     Annual eye exam and contact fit  Current Rx 2 years old  CLs ra, Hx of EW no longer.  Has not worn CLs in several years   (-)Flashes (-)Floaters  (-)Itch, (-)tear, (-)burn, (-)Dryness. (-) OTC Drops   (-)Photophobia  (-)Glare (-)diplopia (--) headaches          Last edited by Jermaine Chisholm, OD on 12/30/2020  1:42 PM. (History)            Assessment /Plan     For exam results, see Encounter Report.    Myopia of both eyes with astigmatism and presbyopia  Eyeglass Final Rx     Eyeglass Final Rx       Sphere Cylinder Axis Dist VA    Right -6.25 +1.75 085 20/20    Left -6.00 +0.75 075 20/20    Type: SVL    Expiration Date: 12/31/2021              Contact Lens Final Rx     Final Contact Lens Rx       Brand Base Curve Diameter Sphere Cylinder Axis    Right Acuvue Oasys 1 Day for Astigmatism 8.5 14.3 -4.00 -1.75 180    Left Acuvue Oasys 1 Day for Astigmatism 8.5 14.3 -4.50 -0.75 170    Expiration Date: 12/31/2021    Replacement: Daily    Wearing Schedule: Daily wear              Signature scanned and obtained      RTC 1 yr

## 2021-04-05 ENCOUNTER — PATIENT MESSAGE (OUTPATIENT)
Dept: ADMINISTRATIVE | Facility: HOSPITAL | Age: 26
End: 2021-04-05

## 2021-05-29 ENCOUNTER — HOSPITAL ENCOUNTER (OUTPATIENT)
Facility: OTHER | Age: 26
Discharge: HOME | End: 2021-05-30
Attending: EMERGENCY MEDICINE | Admitting: INTERNAL MEDICINE
Payer: COMMERCIAL

## 2021-05-29 DIAGNOSIS — R11.2 INTRACTABLE NAUSEA AND VOMITING: Primary | ICD-10-CM

## 2021-05-29 PROBLEM — Z72.0 TOBACCO USER: Status: ACTIVE | Noted: 2021-05-29

## 2021-05-29 PROBLEM — F64.9 GENDER DYSPHORIA: Status: ACTIVE | Noted: 2021-05-29

## 2021-05-29 LAB
ALBUMIN SERPL BCP-MCNC: 5.3 G/DL (ref 3.5–5.2)
ALP SERPL-CCNC: 112 U/L (ref 55–135)
ALT SERPL W/O P-5'-P-CCNC: 58 U/L (ref 10–44)
ANION GAP SERPL CALC-SCNC: 14 MMOL/L (ref 8–16)
AST SERPL-CCNC: 62 U/L (ref 10–40)
BASOPHILS # BLD AUTO: 0.06 K/UL (ref 0–0.2)
BASOPHILS NFR BLD: 0.3 % (ref 0–1.9)
BILIRUB SERPL-MCNC: 0.9 MG/DL (ref 0.1–1)
BUN SERPL-MCNC: 16 MG/DL (ref 6–20)
CALCIUM SERPL-MCNC: 9.9 MG/DL (ref 8.7–10.5)
CHLORIDE SERPL-SCNC: 108 MMOL/L (ref 95–110)
CO2 SERPL-SCNC: 20 MMOL/L (ref 23–29)
CREAT SERPL-MCNC: 0.9 MG/DL (ref 0.5–1.4)
CTP QC/QA: YES
DIFFERENTIAL METHOD: ABNORMAL
EOSINOPHIL # BLD AUTO: 0 K/UL (ref 0–0.5)
EOSINOPHIL NFR BLD: 0 % (ref 0–8)
ERYTHROCYTE [DISTWIDTH] IN BLOOD BY AUTOMATED COUNT: 11.9 % (ref 11.5–14.5)
EST. GFR  (AFRICAN AMERICAN): >60 ML/MIN/1.73 M^2
EST. GFR  (NON AFRICAN AMERICAN): >60 ML/MIN/1.73 M^2
GLUCOSE SERPL-MCNC: 82 MG/DL (ref 70–110)
HCT VFR BLD AUTO: 44.6 % (ref 37–48.5)
HGB BLD-MCNC: 15.6 G/DL (ref 12–16)
IMM GRANULOCYTES # BLD AUTO: 0.14 K/UL (ref 0–0.04)
IMM GRANULOCYTES NFR BLD AUTO: 0.6 % (ref 0–0.5)
LIPASE SERPL-CCNC: 11 U/L (ref 4–60)
LYMPHOCYTES # BLD AUTO: 2.2 K/UL (ref 1–4.8)
LYMPHOCYTES NFR BLD: 9.3 % (ref 18–48)
MCH RBC QN AUTO: 30.6 PG (ref 27–31)
MCHC RBC AUTO-ENTMCNC: 35 G/DL (ref 32–36)
MCV RBC AUTO: 88 FL (ref 82–98)
MONOCYTES # BLD AUTO: 0.6 K/UL (ref 0.3–1)
MONOCYTES NFR BLD: 2.6 % (ref 4–15)
NEUTROPHILS # BLD AUTO: 20.9 K/UL (ref 1.8–7.7)
NEUTROPHILS NFR BLD: 87.2 % (ref 38–73)
NRBC BLD-RTO: 0 /100 WBC
PLATELET # BLD AUTO: 372 K/UL (ref 150–450)
PLATELET BLD QL SMEAR: ABNORMAL
PMV BLD AUTO: 9.7 FL (ref 9.2–12.9)
POTASSIUM SERPL-SCNC: 4.1 MMOL/L (ref 3.5–5.1)
PROT SERPL-MCNC: 8.6 G/DL (ref 6–8.4)
RBC # BLD AUTO: 5.1 M/UL (ref 4–5.4)
SARS-COV-2 RDRP RESP QL NAA+PROBE: NEGATIVE
SODIUM SERPL-SCNC: 142 MMOL/L (ref 136–145)
WBC # BLD AUTO: 23.95 K/UL (ref 3.9–12.7)

## 2021-05-29 PROCEDURE — G0378 HOSPITAL OBSERVATION PER HR: HCPCS

## 2021-05-29 PROCEDURE — 85025 COMPLETE CBC W/AUTO DIFF WBC: CPT | Performed by: NURSE PRACTITIONER

## 2021-05-29 PROCEDURE — 80053 COMPREHEN METABOLIC PANEL: CPT | Performed by: NURSE PRACTITIONER

## 2021-05-29 PROCEDURE — 99220 PR INITIAL OBSERVATION CARE,LEVL III: ICD-10-PCS | Mod: ,,, | Performed by: PHYSICIAN ASSISTANT

## 2021-05-29 PROCEDURE — 96361 HYDRATE IV INFUSION ADD-ON: CPT

## 2021-05-29 PROCEDURE — 99220 PR INITIAL OBSERVATION CARE,LEVL III: CPT | Mod: ,,, | Performed by: PHYSICIAN ASSISTANT

## 2021-05-29 PROCEDURE — 96375 TX/PRO/DX INJ NEW DRUG ADDON: CPT

## 2021-05-29 PROCEDURE — 96365 THER/PROPH/DIAG IV INF INIT: CPT

## 2021-05-29 PROCEDURE — 63600175 PHARM REV CODE 636 W HCPCS: Performed by: NURSE PRACTITIONER

## 2021-05-29 PROCEDURE — 25000003 PHARM REV CODE 250: Performed by: NURSE PRACTITIONER

## 2021-05-29 PROCEDURE — 83690 ASSAY OF LIPASE: CPT | Performed by: NURSE PRACTITIONER

## 2021-05-29 PROCEDURE — U0002 COVID-19 LAB TEST NON-CDC: HCPCS | Performed by: NURSE PRACTITIONER

## 2021-05-29 PROCEDURE — 99291 CRITICAL CARE FIRST HOUR: CPT | Mod: 25

## 2021-05-29 RX ORDER — TALC
6 POWDER (GRAM) TOPICAL NIGHTLY PRN
Status: DISCONTINUED | OUTPATIENT
Start: 2021-05-29 | End: 2021-05-30 | Stop reason: HOSPADM

## 2021-05-29 RX ORDER — SODIUM CHLORIDE 0.9 % (FLUSH) 0.9 %
10 SYRINGE (ML) INJECTION
Status: DISCONTINUED | OUTPATIENT
Start: 2021-05-29 | End: 2021-05-30 | Stop reason: HOSPADM

## 2021-05-29 RX ORDER — DIPHENHYDRAMINE HYDROCHLORIDE 50 MG/ML
12.5 INJECTION INTRAMUSCULAR; INTRAVENOUS
Status: COMPLETED | OUTPATIENT
Start: 2021-05-29 | End: 2021-05-29

## 2021-05-29 RX ORDER — ONDANSETRON 2 MG/ML
8 INJECTION INTRAMUSCULAR; INTRAVENOUS
Status: COMPLETED | OUTPATIENT
Start: 2021-05-29 | End: 2021-05-29

## 2021-05-29 RX ORDER — PROCHLORPERAZINE EDISYLATE 5 MG/ML
5 INJECTION INTRAMUSCULAR; INTRAVENOUS ONCE
Status: COMPLETED | OUTPATIENT
Start: 2021-05-29 | End: 2021-05-29

## 2021-05-29 RX ADMIN — PROMETHAZINE HYDROCHLORIDE 12.5 MG: 25 INJECTION INTRAMUSCULAR; INTRAVENOUS at 07:05

## 2021-05-29 RX ADMIN — SODIUM CHLORIDE 2000 ML: 0.9 INJECTION, SOLUTION INTRAVENOUS at 05:05

## 2021-05-29 RX ADMIN — DIPHENHYDRAMINE HYDROCHLORIDE 12.5 MG: 50 INJECTION INTRAMUSCULAR; INTRAVENOUS at 09:05

## 2021-05-29 RX ADMIN — PROCHLORPERAZINE EDISYLATE 5 MG: 5 INJECTION INTRAMUSCULAR; INTRAVENOUS at 09:05

## 2021-05-29 RX ADMIN — FOLIC ACID: 5 INJECTION, SOLUTION INTRAMUSCULAR; INTRAVENOUS; SUBCUTANEOUS at 10:05

## 2021-05-29 RX ADMIN — SODIUM CHLORIDE 1000 ML: 0.9 INJECTION, SOLUTION INTRAVENOUS at 07:05

## 2021-05-29 RX ADMIN — ONDANSETRON 8 MG: 2 INJECTION INTRAMUSCULAR; INTRAVENOUS at 05:05

## 2021-05-30 VITALS
TEMPERATURE: 98 F | HEIGHT: 65 IN | BODY MASS INDEX: 18.83 KG/M2 | OXYGEN SATURATION: 98 % | RESPIRATION RATE: 16 BRPM | HEART RATE: 67 BPM | SYSTOLIC BLOOD PRESSURE: 104 MMHG | WEIGHT: 113 LBS | DIASTOLIC BLOOD PRESSURE: 60 MMHG

## 2021-05-30 PROBLEM — D72.829 LEUKOCYTOSIS: Status: ACTIVE | Noted: 2021-05-30

## 2021-05-30 LAB
ALBUMIN SERPL BCP-MCNC: 4.1 G/DL (ref 3.5–5.2)
ALP SERPL-CCNC: 86 U/L (ref 55–135)
ALT SERPL W/O P-5'-P-CCNC: 41 U/L (ref 10–44)
ANION GAP SERPL CALC-SCNC: 14 MMOL/L (ref 8–16)
AST SERPL-CCNC: 37 U/L (ref 10–40)
BASOPHILS # BLD AUTO: 0.01 K/UL (ref 0–0.2)
BASOPHILS NFR BLD: 0.1 % (ref 0–1.9)
BILIRUB SERPL-MCNC: 1.5 MG/DL (ref 0.1–1)
BUN SERPL-MCNC: 11 MG/DL (ref 6–20)
CALCIUM SERPL-MCNC: 8.6 MG/DL (ref 8.7–10.5)
CHLORIDE SERPL-SCNC: 108 MMOL/L (ref 95–110)
CO2 SERPL-SCNC: 16 MMOL/L (ref 23–29)
CREAT SERPL-MCNC: 0.7 MG/DL (ref 0.5–1.4)
DIFFERENTIAL METHOD: ABNORMAL
EOSINOPHIL # BLD AUTO: 0 K/UL (ref 0–0.5)
EOSINOPHIL NFR BLD: 0 % (ref 0–8)
ERYTHROCYTE [DISTWIDTH] IN BLOOD BY AUTOMATED COUNT: 12 % (ref 11.5–14.5)
EST. GFR  (AFRICAN AMERICAN): >60 ML/MIN/1.73 M^2
EST. GFR  (NON AFRICAN AMERICAN): >60 ML/MIN/1.73 M^2
GLUCOSE SERPL-MCNC: 78 MG/DL (ref 70–110)
HCT VFR BLD AUTO: 39.1 % (ref 37–48.5)
HGB BLD-MCNC: 13.5 G/DL (ref 12–16)
IMM GRANULOCYTES # BLD AUTO: 0.04 K/UL (ref 0–0.04)
IMM GRANULOCYTES NFR BLD AUTO: 0.3 % (ref 0–0.5)
LYMPHOCYTES # BLD AUTO: 2.6 K/UL (ref 1–4.8)
LYMPHOCYTES NFR BLD: 18.4 % (ref 18–48)
MAGNESIUM SERPL-MCNC: 1.7 MG/DL (ref 1.6–2.6)
MCH RBC QN AUTO: 30.6 PG (ref 27–31)
MCHC RBC AUTO-ENTMCNC: 34.5 G/DL (ref 32–36)
MCV RBC AUTO: 89 FL (ref 82–98)
MONOCYTES # BLD AUTO: 0.9 K/UL (ref 0.3–1)
MONOCYTES NFR BLD: 6.1 % (ref 4–15)
NEUTROPHILS # BLD AUTO: 10.5 K/UL (ref 1.8–7.7)
NEUTROPHILS NFR BLD: 75.1 % (ref 38–73)
NRBC BLD-RTO: 0 /100 WBC
PLATELET # BLD AUTO: 323 K/UL (ref 150–450)
PMV BLD AUTO: 9.6 FL (ref 9.2–12.9)
POTASSIUM SERPL-SCNC: 3.9 MMOL/L (ref 3.5–5.1)
PROT SERPL-MCNC: 6.6 G/DL (ref 6–8.4)
RBC # BLD AUTO: 4.41 M/UL (ref 4–5.4)
SODIUM SERPL-SCNC: 138 MMOL/L (ref 136–145)
WBC # BLD AUTO: 13.91 K/UL (ref 3.9–12.7)

## 2021-05-30 PROCEDURE — 85025 COMPLETE CBC W/AUTO DIFF WBC: CPT | Performed by: PHYSICIAN ASSISTANT

## 2021-05-30 PROCEDURE — 94761 N-INVAS EAR/PLS OXIMETRY MLT: CPT

## 2021-05-30 PROCEDURE — 36415 COLL VENOUS BLD VENIPUNCTURE: CPT | Performed by: PHYSICIAN ASSISTANT

## 2021-05-30 PROCEDURE — G0378 HOSPITAL OBSERVATION PER HR: HCPCS

## 2021-05-30 PROCEDURE — 83735 ASSAY OF MAGNESIUM: CPT | Performed by: PHYSICIAN ASSISTANT

## 2021-05-30 PROCEDURE — 63600175 PHARM REV CODE 636 W HCPCS: Performed by: PHYSICIAN ASSISTANT

## 2021-05-30 PROCEDURE — 99217 PR OBSERVATION CARE DISCHARGE: ICD-10-PCS | Mod: ,,, | Performed by: PHYSICIAN ASSISTANT

## 2021-05-30 PROCEDURE — 96376 TX/PRO/DX INJ SAME DRUG ADON: CPT

## 2021-05-30 PROCEDURE — 80053 COMPREHEN METABOLIC PANEL: CPT | Performed by: PHYSICIAN ASSISTANT

## 2021-05-30 PROCEDURE — 99217 PR OBSERVATION CARE DISCHARGE: CPT | Mod: ,,, | Performed by: PHYSICIAN ASSISTANT

## 2021-05-30 RX ORDER — ONDANSETRON 2 MG/ML
4 INJECTION INTRAMUSCULAR; INTRAVENOUS EVERY 6 HOURS PRN
Status: DISCONTINUED | OUTPATIENT
Start: 2021-05-30 | End: 2021-05-30 | Stop reason: HOSPADM

## 2021-05-30 RX ORDER — ONDANSETRON 4 MG/1
4 TABLET, ORALLY DISINTEGRATING ORAL EVERY 6 HOURS PRN
Qty: 20 TABLET | Refills: 0 | Status: SHIPPED | OUTPATIENT
Start: 2021-05-30 | End: 2021-06-25

## 2021-05-30 RX ADMIN — ONDANSETRON 4 MG: 2 INJECTION INTRAMUSCULAR; INTRAVENOUS at 12:05

## 2021-06-10 ENCOUNTER — PATIENT MESSAGE (OUTPATIENT)
Dept: INTERNAL MEDICINE | Facility: CLINIC | Age: 26
End: 2021-06-10

## 2021-06-25 ENCOUNTER — OFFICE VISIT (OUTPATIENT)
Dept: URGENT CARE | Facility: CLINIC | Age: 26
End: 2021-06-25
Payer: COMMERCIAL

## 2021-06-25 VITALS
SYSTOLIC BLOOD PRESSURE: 101 MMHG | BODY MASS INDEX: 18.83 KG/M2 | DIASTOLIC BLOOD PRESSURE: 67 MMHG | TEMPERATURE: 98 F | WEIGHT: 113 LBS | RESPIRATION RATE: 16 BRPM | HEIGHT: 65 IN | HEART RATE: 105 BPM | OXYGEN SATURATION: 99 %

## 2021-06-25 DIAGNOSIS — R11.0 NAUSEA: ICD-10-CM

## 2021-06-25 DIAGNOSIS — R10.32 LEFT LOWER QUADRANT ABDOMINAL PAIN: Primary | ICD-10-CM

## 2021-06-25 LAB
B-HCG UR QL: NEGATIVE
BILIRUB UR QL STRIP: NEGATIVE
CTP QC/QA: YES
GLUCOSE UR QL STRIP: NEGATIVE
KETONES UR QL STRIP: NEGATIVE
LEUKOCYTE ESTERASE UR QL STRIP: NEGATIVE
PH, POC UA: 7.5 (ref 5–8)
POC BLOOD, URINE: NEGATIVE
POC NITRATES, URINE: NEGATIVE
PROT UR QL STRIP: NEGATIVE
SP GR UR STRIP: 1.01 (ref 1–1.03)
UROBILINOGEN UR STRIP-ACNC: NORMAL (ref 0.1–1.1)

## 2021-06-25 PROCEDURE — 74019 XR ABDOMEN FLAT AND ERECT: ICD-10-PCS | Mod: FY,S$GLB,, | Performed by: RADIOLOGY

## 2021-06-25 PROCEDURE — 81025 URINE PREGNANCY TEST: CPT | Mod: S$GLB,,, | Performed by: NURSE PRACTITIONER

## 2021-06-25 PROCEDURE — 74019 RADEX ABDOMEN 2 VIEWS: CPT | Mod: FY,S$GLB,, | Performed by: RADIOLOGY

## 2021-06-25 PROCEDURE — S0119 PR ONDANSETRON, ORAL, 4MG: ICD-10-PCS | Mod: S$GLB,,, | Performed by: EMERGENCY MEDICINE

## 2021-06-25 PROCEDURE — 3008F PR BODY MASS INDEX (BMI) DOCUMENTED: ICD-10-PCS | Mod: CPTII,S$GLB,, | Performed by: NURSE PRACTITIONER

## 2021-06-25 PROCEDURE — 1125F PR PAIN SEVERITY QUANTIFIED, PAIN PRESENT: ICD-10-PCS | Mod: S$GLB,,, | Performed by: NURSE PRACTITIONER

## 2021-06-25 PROCEDURE — S0119 ONDANSETRON 4 MG: HCPCS | Mod: S$GLB,,, | Performed by: EMERGENCY MEDICINE

## 2021-06-25 PROCEDURE — 99214 OFFICE O/P EST MOD 30 MIN: CPT | Mod: 25,S$GLB,, | Performed by: NURSE PRACTITIONER

## 2021-06-25 PROCEDURE — 81003 POCT URINALYSIS, DIPSTICK, AUTOMATED, W/O SCOPE: ICD-10-PCS | Mod: QW,S$GLB,, | Performed by: NURSE PRACTITIONER

## 2021-06-25 PROCEDURE — 99214 PR OFFICE/OUTPT VISIT, EST, LEVL IV, 30-39 MIN: ICD-10-PCS | Mod: 25,S$GLB,, | Performed by: NURSE PRACTITIONER

## 2021-06-25 PROCEDURE — 81025 POCT URINE PREGNANCY: ICD-10-PCS | Mod: S$GLB,,, | Performed by: NURSE PRACTITIONER

## 2021-06-25 PROCEDURE — 81003 URINALYSIS AUTO W/O SCOPE: CPT | Mod: QW,S$GLB,, | Performed by: NURSE PRACTITIONER

## 2021-06-25 PROCEDURE — 3008F BODY MASS INDEX DOCD: CPT | Mod: CPTII,S$GLB,, | Performed by: NURSE PRACTITIONER

## 2021-06-25 PROCEDURE — 1125F AMNT PAIN NOTED PAIN PRSNT: CPT | Mod: S$GLB,,, | Performed by: NURSE PRACTITIONER

## 2021-06-25 RX ORDER — ONDANSETRON 4 MG/1
4 TABLET, ORALLY DISINTEGRATING ORAL
Status: COMPLETED | OUTPATIENT
Start: 2021-06-25 | End: 2021-06-25

## 2021-06-25 RX ORDER — ONDANSETRON 4 MG/1
4 TABLET, ORALLY DISINTEGRATING ORAL EVERY 6 HOURS PRN
Qty: 20 TABLET | Refills: 0 | Status: SHIPPED | OUTPATIENT
Start: 2021-06-25 | End: 2021-07-02

## 2021-06-25 RX ADMIN — ONDANSETRON 4 MG: 4 TABLET, ORALLY DISINTEGRATING ORAL at 11:06

## 2021-10-02 ENCOUNTER — CLINICAL SUPPORT (OUTPATIENT)
Dept: OTHER | Facility: CLINIC | Age: 26
End: 2021-10-02
Payer: COMMERCIAL

## 2021-10-02 DIAGNOSIS — Z00.8 ENCOUNTER FOR OTHER GENERAL EXAMINATION: ICD-10-CM

## 2021-10-03 VITALS — BODY MASS INDEX: 18.8 KG/M2 | HEIGHT: 65 IN

## 2021-10-03 LAB
HDLC SERPL-MCNC: 56 MG/DL
POC CHOLESTEROL, LDL (DOCK): 56 MG/DL
POC CHOLESTEROL, TOTAL: 124 MG/DL
POC GLUCOSE, FASTING: 89 MG/DL (ref 60–110)
TRIGL SERPL-MCNC: 58 MG/DL

## 2021-12-03 ENCOUNTER — OFFICE VISIT (OUTPATIENT)
Dept: PRIMARY CARE CLINIC | Facility: CLINIC | Age: 26
End: 2021-12-03
Payer: COMMERCIAL

## 2021-12-03 DIAGNOSIS — J06.9 VIRAL URI WITH COUGH: Primary | ICD-10-CM

## 2021-12-03 PROBLEM — R11.2 INTRACTABLE NAUSEA AND VOMITING: Status: RESOLVED | Noted: 2021-05-29 | Resolved: 2021-12-03

## 2021-12-03 PROBLEM — S82.851A CLOSED TRIMALLEOLAR FRACTURE OF RIGHT ANKLE: Status: RESOLVED | Noted: 2020-05-19 | Resolved: 2021-12-03

## 2021-12-03 LAB
CTP QC/QA: YES
POC MOLECULAR INFLUENZA A AGN: NEGATIVE
POC MOLECULAR INFLUENZA B AGN: NEGATIVE

## 2021-12-03 PROCEDURE — 99214 PR OFFICE/OUTPT VISIT, EST, LEVL IV, 30-39 MIN: ICD-10-PCS | Mod: 95,,, | Performed by: FAMILY MEDICINE

## 2021-12-03 PROCEDURE — 99214 OFFICE O/P EST MOD 30 MIN: CPT | Mod: 95,,, | Performed by: FAMILY MEDICINE

## 2021-12-03 RX ORDER — TESTOSTERONE 20.25 MG/1.25G
3 GEL TOPICAL DAILY
COMMUNITY
Start: 2021-10-22

## 2021-12-03 RX ORDER — ONDANSETRON 4 MG/1
4 TABLET, ORALLY DISINTEGRATING ORAL EVERY 6 HOURS PRN
Qty: 20 TABLET | Refills: 0 | Status: SHIPPED | OUTPATIENT
Start: 2021-12-03

## 2021-12-05 ENCOUNTER — PATIENT MESSAGE (OUTPATIENT)
Dept: PRIMARY CARE CLINIC | Facility: CLINIC | Age: 26
End: 2021-12-05
Payer: COMMERCIAL

## 2021-12-06 ENCOUNTER — PATIENT MESSAGE (OUTPATIENT)
Dept: PRIMARY CARE CLINIC | Facility: CLINIC | Age: 26
End: 2021-12-06
Payer: COMMERCIAL

## 2021-12-06 RX ORDER — AZITHROMYCIN 250 MG/1
TABLET, FILM COATED ORAL
Qty: 6 TABLET | Refills: 0 | Status: SHIPPED | OUTPATIENT
Start: 2021-12-06 | End: 2021-12-11

## 2022-11-22 ENCOUNTER — CLINICAL SUPPORT (OUTPATIENT)
Dept: OTHER | Facility: CLINIC | Age: 27
End: 2022-11-22

## 2022-11-22 DIAGNOSIS — Z00.8 ENCOUNTER FOR OTHER GENERAL EXAMINATION: ICD-10-CM

## 2022-11-23 VITALS
SYSTOLIC BLOOD PRESSURE: 112 MMHG | DIASTOLIC BLOOD PRESSURE: 74 MMHG | BODY MASS INDEX: 19.99 KG/M2 | WEIGHT: 120 LBS | HEIGHT: 65 IN

## 2022-11-23 LAB
HDLC SERPL-MCNC: 68 MG/DL
POC CHOLESTEROL, LDL (DOCK): 86 MG/DL
POC CHOLESTEROL, TOTAL: 167 MG/DL
POC GLUCOSE, FASTING: 90 MG/DL (ref 60–110)
TRIGL SERPL-MCNC: 70 MG/DL

## (undated) DEVICE — PAD CAST SPECIALIST STRL 4

## (undated) DEVICE — DRAPE STERI U-SHAPED 47X51IN

## (undated) DEVICE — DRESSING XEROFORM FOIL PK 1X8

## (undated) DEVICE — SOL 9P NACL IRR PIC IL

## (undated) DEVICE — DRAPE INCISE IOBAN 2 23X17IN

## (undated) DEVICE — DRAPE XRAY EQUIPMENT UNIV

## (undated) DEVICE — GLOVE BIOGEL SKINSENSE PI 7.5

## (undated) DEVICE — SUT VICRYL 0 27 CT-2

## (undated) DEVICE — SPONGE COTTON TRAY 4X4IN

## (undated) DEVICE — SEE MEDLINE ITEM 146308

## (undated) DEVICE — SPLINT FIBERGLASS PAD 4X15

## (undated) DEVICE — DRAPE PLASTIC U 60X72

## (undated) DEVICE — SEE MEDLINE ITEM 146231

## (undated) DEVICE — APPLICATOR CHLORAPREP ORN 26ML

## (undated) DEVICE — BIT DRILL 3.5MM 110MM STERILE

## (undated) DEVICE — SUT VICRYL 2-0 CT-2 VCP269H

## (undated) DEVICE — SUT 3-0 ETHILON 18 FS-1

## (undated) DEVICE — SEE MEDLINE ITEM 152523

## (undated) DEVICE — SEE MEDLINE ITEM 157216

## (undated) DEVICE — SEE MEDLINE ITEM 156930

## (undated) DEVICE — GLOVE BIOGEL SKINSENSE PI 8.0

## (undated) DEVICE — TOURNIQUET SB QC DP 34X4IN

## (undated) DEVICE — GOWN SMART IMP BREATHABLE XXLG

## (undated) DEVICE — DRESSING XEROFORM 1X8IN

## (undated) DEVICE — BIT DRILL CANN QC 2.7X160 SS

## (undated) DEVICE — KWIRE NTHRD 1.25X150MM
Type: IMPLANTABLE DEVICE | Site: ANKLE | Status: NON-FUNCTIONAL
Removed: 2020-05-19

## (undated) DEVICE — SEE MEDLINE ITEM 146298

## (undated) DEVICE — Device

## (undated) DEVICE — SEE MEDLINE ITEM 152530

## (undated) DEVICE — ELECTRODE REM PLYHSV RETURN 9

## (undated) DEVICE — SEE MEDLINE ITEM 146270

## (undated) DEVICE — BIT BRILL 2.5

## (undated) DEVICE — ADHESIVE DERMABOND ADVANCED

## (undated) DEVICE — GLOVE BIOGEL SKINSENSE PI 7.0

## (undated) DEVICE — UNDERGLOVES BIOGEL PI SZ 7 LF